# Patient Record
Sex: FEMALE | Race: BLACK OR AFRICAN AMERICAN | Employment: UNEMPLOYED | URBAN - NONMETROPOLITAN AREA
[De-identification: names, ages, dates, MRNs, and addresses within clinical notes are randomized per-mention and may not be internally consistent; named-entity substitution may affect disease eponyms.]

---

## 2017-02-24 ENCOUNTER — TELEPHONE (OUTPATIENT)
Dept: FAMILY MEDICINE CLINIC | Age: 32
End: 2017-02-24

## 2017-03-14 ENCOUNTER — OFFICE VISIT (OUTPATIENT)
Dept: FAMILY MEDICINE CLINIC | Age: 32
End: 2017-03-14

## 2017-03-14 VITALS
DIASTOLIC BLOOD PRESSURE: 70 MMHG | WEIGHT: 192.6 LBS | BODY MASS INDEX: 29.19 KG/M2 | OXYGEN SATURATION: 98 % | HEIGHT: 68 IN | HEART RATE: 89 BPM | SYSTOLIC BLOOD PRESSURE: 120 MMHG

## 2017-03-14 DIAGNOSIS — F41.1 GAD (GENERALIZED ANXIETY DISORDER): Primary | ICD-10-CM

## 2017-03-14 DIAGNOSIS — F41.0 PANIC ATTACKS: ICD-10-CM

## 2017-03-14 DIAGNOSIS — L21.9 SEBORRHEIC DERMATITIS: ICD-10-CM

## 2017-03-14 PROCEDURE — 99214 OFFICE O/P EST MOD 30 MIN: CPT | Performed by: FAMILY MEDICINE

## 2017-03-14 RX ORDER — BUSPIRONE HYDROCHLORIDE 7.5 MG/1
7.5 TABLET ORAL 2 TIMES DAILY PRN
Qty: 40 TABLET | Refills: 1 | Status: SHIPPED | OUTPATIENT
Start: 2017-03-14 | End: 2017-08-14

## 2017-03-14 RX ORDER — VENLAFAXINE HYDROCHLORIDE 150 MG/1
150 CAPSULE, EXTENDED RELEASE ORAL DAILY
Qty: 30 CAPSULE | Refills: 5 | Status: SHIPPED | OUTPATIENT
Start: 2017-03-14 | End: 2017-09-18 | Stop reason: SDUPTHER

## 2017-03-14 RX ORDER — KETOCONAZOLE 20 MG/ML
SHAMPOO TOPICAL
Qty: 120 ML | Refills: 1 | Status: SHIPPED | OUTPATIENT
Start: 2017-03-14 | End: 2019-07-26

## 2017-03-14 RX ORDER — KETOCONAZOLE 20 MG/G
CREAM TOPICAL
Qty: 30 G | Refills: 1 | Status: SHIPPED | OUTPATIENT
Start: 2017-03-14 | End: 2022-06-28 | Stop reason: HOSPADM

## 2017-03-26 ASSESSMENT — ENCOUNTER SYMPTOMS
BLURRED VISION: 0
COUGH: 0
ABDOMINAL PAIN: 0

## 2017-08-14 ENCOUNTER — OFFICE VISIT (OUTPATIENT)
Dept: FAMILY MEDICINE CLINIC | Age: 32
End: 2017-08-14

## 2017-08-14 VITALS
HEART RATE: 108 BPM | HEIGHT: 68 IN | DIASTOLIC BLOOD PRESSURE: 78 MMHG | WEIGHT: 200.6 LBS | OXYGEN SATURATION: 99 % | BODY MASS INDEX: 30.4 KG/M2 | SYSTOLIC BLOOD PRESSURE: 118 MMHG

## 2017-08-14 DIAGNOSIS — F06.30 MENSTRUAL-RELATED MOOD DISORDER: ICD-10-CM

## 2017-08-14 DIAGNOSIS — F41.1 GAD (GENERALIZED ANXIETY DISORDER): Primary | ICD-10-CM

## 2017-08-14 PROCEDURE — 99213 OFFICE O/P EST LOW 20 MIN: CPT | Performed by: FAMILY MEDICINE

## 2017-08-14 RX ORDER — CLONIDINE HYDROCHLORIDE 0.1 MG/1
0.1 TABLET ORAL 2 TIMES DAILY PRN
Qty: 40 TABLET | Refills: 1 | Status: SHIPPED | OUTPATIENT
Start: 2017-08-14 | End: 2018-05-22

## 2017-08-14 RX ORDER — LEVONORGESTREL AND ETHINYL ESTRADIOL 0.15-0.03
1 KIT ORAL DAILY
Qty: 1 PACKET | Refills: 3 | Status: SHIPPED | OUTPATIENT
Start: 2017-08-14 | End: 2018-05-22

## 2017-08-26 ASSESSMENT — ENCOUNTER SYMPTOMS
BLURRED VISION: 0
COUGH: 0
ABDOMINAL PAIN: 0

## 2017-09-18 DIAGNOSIS — F41.0 PANIC ATTACKS: ICD-10-CM

## 2017-09-18 DIAGNOSIS — F41.1 GAD (GENERALIZED ANXIETY DISORDER): ICD-10-CM

## 2017-09-19 RX ORDER — VENLAFAXINE HYDROCHLORIDE 150 MG/1
CAPSULE, EXTENDED RELEASE ORAL
Qty: 30 CAPSULE | Refills: 5 | Status: SHIPPED | OUTPATIENT
Start: 2017-09-19 | End: 2018-04-17 | Stop reason: SDUPTHER

## 2018-03-15 ENCOUNTER — TELEPHONE (OUTPATIENT)
Dept: FAMILY MEDICINE CLINIC | Age: 33
End: 2018-03-15

## 2018-04-17 DIAGNOSIS — F41.0 PANIC ATTACKS: ICD-10-CM

## 2018-04-17 DIAGNOSIS — F41.1 GAD (GENERALIZED ANXIETY DISORDER): ICD-10-CM

## 2018-04-19 RX ORDER — VENLAFAXINE HYDROCHLORIDE 150 MG/1
CAPSULE, EXTENDED RELEASE ORAL
Qty: 30 CAPSULE | Refills: 1 | Status: SHIPPED | OUTPATIENT
Start: 2018-04-19 | End: 2018-06-30 | Stop reason: SDUPTHER

## 2018-05-22 ENCOUNTER — OFFICE VISIT (OUTPATIENT)
Dept: FAMILY MEDICINE CLINIC | Age: 33
End: 2018-05-22

## 2018-05-22 VITALS
HEART RATE: 114 BPM | SYSTOLIC BLOOD PRESSURE: 112 MMHG | BODY MASS INDEX: 32.67 KG/M2 | DIASTOLIC BLOOD PRESSURE: 80 MMHG | OXYGEN SATURATION: 98 % | WEIGHT: 215.6 LBS | HEIGHT: 68 IN

## 2018-05-22 DIAGNOSIS — M25.532 PAIN OF BOTH WRIST JOINTS: ICD-10-CM

## 2018-05-22 DIAGNOSIS — R53.83 FATIGUE, UNSPECIFIED TYPE: ICD-10-CM

## 2018-05-22 DIAGNOSIS — R63.5 WEIGHT GAIN: ICD-10-CM

## 2018-05-22 DIAGNOSIS — F41.9 ANXIETY: Primary | ICD-10-CM

## 2018-05-22 DIAGNOSIS — M25.531 PAIN OF BOTH WRIST JOINTS: ICD-10-CM

## 2018-05-22 LAB
FOLATE: 11.68 NG/ML (ref 4.78–24.2)
HCT VFR BLD CALC: 36.4 % (ref 36–48)
HEMOGLOBIN: 13.1 G/DL (ref 12–16)
MCH RBC QN AUTO: 29.2 PG (ref 26–34)
MCHC RBC AUTO-ENTMCNC: 35.9 G/DL (ref 31–36)
MCV RBC AUTO: 81.3 FL (ref 80–100)
PDW BLD-RTO: 14.1 % (ref 12.4–15.4)
PLATELET # BLD: 401 K/UL (ref 135–450)
PMV BLD AUTO: 7.9 FL (ref 5–10.5)
RBC # BLD: 4.48 M/UL (ref 4–5.2)
RHEUMATOID FACTOR: <10 IU/ML
SEDIMENTATION RATE, ERYTHROCYTE: 22 MM/HR (ref 0–20)
T4 FREE: 0.9 NG/DL (ref 0.9–1.8)
TSH SERPL DL<=0.05 MIU/L-ACNC: 2.07 UIU/ML (ref 0.27–4.2)
VITAMIN B-12: 536 PG/ML (ref 211–911)
VITAMIN D 25-HYDROXY: 18.2 NG/ML
WBC # BLD: 6 K/UL (ref 4–11)

## 2018-05-22 PROCEDURE — G8417 CALC BMI ABV UP PARAM F/U: HCPCS | Performed by: FAMILY MEDICINE

## 2018-05-22 PROCEDURE — 36415 COLL VENOUS BLD VENIPUNCTURE: CPT | Performed by: FAMILY MEDICINE

## 2018-05-22 PROCEDURE — G8427 DOCREV CUR MEDS BY ELIG CLIN: HCPCS | Performed by: FAMILY MEDICINE

## 2018-05-22 PROCEDURE — 1036F TOBACCO NON-USER: CPT | Performed by: FAMILY MEDICINE

## 2018-05-22 PROCEDURE — 99214 OFFICE O/P EST MOD 30 MIN: CPT | Performed by: FAMILY MEDICINE

## 2018-05-22 ASSESSMENT — PATIENT HEALTH QUESTIONNAIRE - PHQ9
SUM OF ALL RESPONSES TO PHQ9 QUESTIONS 1 & 2: 0
2. FEELING DOWN, DEPRESSED OR HOPELESS: 0
SUM OF ALL RESPONSES TO PHQ QUESTIONS 1-9: 0
1. LITTLE INTEREST OR PLEASURE IN DOING THINGS: 0

## 2018-05-23 ASSESSMENT — ENCOUNTER SYMPTOMS
ABDOMINAL PAIN: 0
BLURRED VISION: 0
COUGH: 0

## 2018-05-24 LAB — DOUBLE STRANDED DNA AB, IGG: NORMAL

## 2018-05-31 DIAGNOSIS — E55.9 VITAMIN D DEFICIENCY: Primary | ICD-10-CM

## 2018-05-31 RX ORDER — ERGOCALCIFEROL 1.25 MG/1
50000 CAPSULE ORAL WEEKLY
Qty: 4 CAPSULE | Refills: 5 | Status: SHIPPED | OUTPATIENT
Start: 2018-05-31 | End: 2018-08-17 | Stop reason: SDUPTHER

## 2018-06-30 DIAGNOSIS — F41.1 GAD (GENERALIZED ANXIETY DISORDER): ICD-10-CM

## 2018-06-30 DIAGNOSIS — F41.0 PANIC ATTACKS: ICD-10-CM

## 2018-07-02 RX ORDER — VENLAFAXINE HYDROCHLORIDE 150 MG/1
CAPSULE, EXTENDED RELEASE ORAL
Qty: 30 CAPSULE | Refills: 5 | Status: SHIPPED | OUTPATIENT
Start: 2018-07-02 | End: 2018-09-27 | Stop reason: SDUPTHER

## 2018-07-25 ENCOUNTER — TELEPHONE (OUTPATIENT)
Dept: FAMILY MEDICINE CLINIC | Age: 33
End: 2018-07-25

## 2018-07-26 ENCOUNTER — TELEPHONE (OUTPATIENT)
Dept: FAMILY MEDICINE CLINIC | Age: 33
End: 2018-07-26

## 2018-07-27 ENCOUNTER — OFFICE VISIT (OUTPATIENT)
Dept: FAMILY MEDICINE CLINIC | Age: 33
End: 2018-07-27

## 2018-07-27 VITALS
HEART RATE: 81 BPM | DIASTOLIC BLOOD PRESSURE: 78 MMHG | BODY MASS INDEX: 33.12 KG/M2 | HEIGHT: 67 IN | SYSTOLIC BLOOD PRESSURE: 118 MMHG | OXYGEN SATURATION: 98 % | WEIGHT: 211 LBS

## 2018-07-27 DIAGNOSIS — F41.9 ANXIETY: Primary | ICD-10-CM

## 2018-07-27 PROCEDURE — G8427 DOCREV CUR MEDS BY ELIG CLIN: HCPCS | Performed by: FAMILY MEDICINE

## 2018-07-27 PROCEDURE — 99213 OFFICE O/P EST LOW 20 MIN: CPT | Performed by: FAMILY MEDICINE

## 2018-07-27 PROCEDURE — G8417 CALC BMI ABV UP PARAM F/U: HCPCS | Performed by: FAMILY MEDICINE

## 2018-07-27 PROCEDURE — 1036F TOBACCO NON-USER: CPT | Performed by: FAMILY MEDICINE

## 2018-07-27 NOTE — PROGRESS NOTES
Chief Complaint   Patient presents with    Anxiety     FMLA--feels pretty well and doing better, she thinks vitamin d is very helpful       Ruby NARRATIVE:    Patient presents for completion of FMLA paperwork. She was seen once this year for anxiety and joint pain issues. Anxiety was under good control  With current medications and she was seeing rheumatologist for joint problems. Patient is established unless otherwise noted. Above chief complaint and Ruby obtained by this physician provider. Review of Systems   Constitutional: Negative for fever and malaise/fatigue. HENT: Negative for congestion. Eyes: Negative for blurred vision. Respiratory: Negative for cough. Cardiovascular: Negative for chest pain and leg swelling. Gastrointestinal: Negative for abdominal pain. Musculoskeletal: Negative for myalgias. Skin: Negative for rash. Neurological: Negative for headaches. Psychiatric/Behavioral: Negative for depression. The patient is nervous/anxious (controlled with current med). Allergies   Allergen Reactions    Lexapro [Escitalopram Oxalate]      Multiple side effects     Allergy history updated. Outpatient Prescriptions Marked as Taking for the 7/27/18 encounter (Office Visit) with Frandy Shields MD   Medication Sig Dispense Refill    venlafaxine (EFFEXOR XR) 150 MG extended release capsule TAKE ONE CAPSULE BY MOUTH EVERY DAY 30 capsule 5    vitamin D (ERGOCALCIFEROL) 66904 units CAPS capsule Take 1 capsule by mouth once a week 4 capsule 5    ketoconazole (NIZORAL) 2 % shampoo Apply 5-10 mL to wet scalp, lather, leave on 3 to 5 minutes, and rinse; once every 1-2 wks (prophylaxis) or 2x weekly for 2-4 wks (treatment 120 mL 1    ketoconazole (NIZORAL) 2 % cream Apply topically twice daily x 4wks or until improvement is noted. Then as needed.  30 g 1    acetaminophen (TYLENOL) 325 MG tablet Take 650 mg by mouth every 6 hours as needed for Pain         Tobacco use history

## 2018-08-05 ASSESSMENT — ENCOUNTER SYMPTOMS
ABDOMINAL PAIN: 0
COUGH: 0
BLURRED VISION: 0

## 2018-08-17 DIAGNOSIS — E55.9 VITAMIN D DEFICIENCY: ICD-10-CM

## 2018-08-20 RX ORDER — ERGOCALCIFEROL 1.25 MG/1
CAPSULE ORAL
Qty: 12 CAPSULE | Refills: 5 | Status: SHIPPED | OUTPATIENT
Start: 2018-08-20 | End: 2019-07-26

## 2018-09-27 DIAGNOSIS — F41.0 PANIC ATTACKS: ICD-10-CM

## 2018-09-27 DIAGNOSIS — F41.1 GAD (GENERALIZED ANXIETY DISORDER): ICD-10-CM

## 2018-09-28 RX ORDER — VENLAFAXINE HYDROCHLORIDE 150 MG/1
CAPSULE, EXTENDED RELEASE ORAL
Qty: 90 CAPSULE | Refills: 3 | Status: SHIPPED | OUTPATIENT
Start: 2018-09-28 | End: 2019-07-26

## 2019-07-26 ENCOUNTER — OFFICE VISIT (OUTPATIENT)
Dept: FAMILY MEDICINE CLINIC | Age: 34
End: 2019-07-26
Payer: COMMERCIAL

## 2019-07-26 VITALS
HEART RATE: 79 BPM | BODY MASS INDEX: 35.4 KG/M2 | WEIGHT: 226 LBS | OXYGEN SATURATION: 99 % | SYSTOLIC BLOOD PRESSURE: 124 MMHG | DIASTOLIC BLOOD PRESSURE: 70 MMHG

## 2019-07-26 DIAGNOSIS — F41.0 PANIC ATTACKS: ICD-10-CM

## 2019-07-26 DIAGNOSIS — F41.1 GAD (GENERALIZED ANXIETY DISORDER): Primary | ICD-10-CM

## 2019-07-26 PROCEDURE — 1036F TOBACCO NON-USER: CPT | Performed by: FAMILY MEDICINE

## 2019-07-26 PROCEDURE — G8417 CALC BMI ABV UP PARAM F/U: HCPCS | Performed by: FAMILY MEDICINE

## 2019-07-26 PROCEDURE — 99213 OFFICE O/P EST LOW 20 MIN: CPT | Performed by: FAMILY MEDICINE

## 2019-07-26 PROCEDURE — G8427 DOCREV CUR MEDS BY ELIG CLIN: HCPCS | Performed by: FAMILY MEDICINE

## 2019-07-26 ASSESSMENT — PATIENT HEALTH QUESTIONNAIRE - PHQ9
SUM OF ALL RESPONSES TO PHQ QUESTIONS 1-9: 2
2. FEELING DOWN, DEPRESSED OR HOPELESS: 1
SUM OF ALL RESPONSES TO PHQ QUESTIONS 1-9: 2
SUM OF ALL RESPONSES TO PHQ9 QUESTIONS 1 & 2: 2
1. LITTLE INTEREST OR PLEASURE IN DOING THINGS: 1

## 2019-07-26 NOTE — PROGRESS NOTES
Nursing note reviewed. Vitals:    07/26/19 1309   BP: 124/70   Site: Left Upper Arm   Position: Sitting   Cuff Size: Large Adult   Pulse: 79   SpO2: 99%   Weight: 226 lb (102.5 kg)          BP Readings from Last 3 Encounters:   07/26/19 124/70   07/27/18 118/78   05/22/18 112/80     Wt Readings from Last 3 Encounters:   07/26/19 226 lb (102.5 kg)   07/27/18 211 lb (95.7 kg)   05/22/18 215 lb 9.6 oz (97.8 kg)     Body mass index is 35.4 kg/m². No results found for this visit on 07/26/19. Physical Exam   Constitutional: She is oriented to person, place, and time. She appears well-developed and well-nourished. No distress. HENT:   Head: Normocephalic and atraumatic. Eyes: Pupils are equal, round, and reactive to light. Conjunctivae and EOM are normal.   Cardiovascular: Normal rate and regular rhythm. Pulmonary/Chest: Effort normal. No respiratory distress. Neurological: She is alert and oriented to person, place, and time. Skin: Skin is warm and dry. She is not diaphoretic. Psychiatric: She has a normal mood and affect. Her behavior is normal. Judgment and thought content normal.   Today no issues, mood is bright and appropriate, mainly issue is with panic attacks   Nursing note and vitals reviewed. _________________________________________________  Assessment:     Jennifer was seen today for anxiety. Diagnoses and all orders for this visit:    CARINE (generalized anxiety disorder)    Panic attacks    Problems listed above are stable except as follows: panic attacks are rare but still cause her to miss work occasionally. Therapeutic plan is unchanged unless otherwise specified. See orders above and comments below for details of workup or medication orders. _________________________________________________  Plan: We can try wellbutrin if not doing well. Form completed and copy scanned. Return if symptoms worsen or fail to improve.       Electronically signed by Nicholas Duran MD on 7/26/19 at 1:27 PM

## 2019-07-31 ASSESSMENT — ENCOUNTER SYMPTOMS
WHEEZING: 0
BACK PAIN: 0
CHEST TIGHTNESS: 0
COUGH: 0
SHORTNESS OF BREATH: 0
ABDOMINAL PAIN: 0

## 2019-09-04 ENCOUNTER — OFFICE VISIT (OUTPATIENT)
Dept: FAMILY MEDICINE CLINIC | Age: 34
End: 2019-09-04
Payer: COMMERCIAL

## 2019-09-04 VITALS
WEIGHT: 234.2 LBS | HEART RATE: 80 BPM | DIASTOLIC BLOOD PRESSURE: 72 MMHG | OXYGEN SATURATION: 98 % | SYSTOLIC BLOOD PRESSURE: 122 MMHG | HEIGHT: 68 IN | BODY MASS INDEX: 35.49 KG/M2

## 2019-09-04 DIAGNOSIS — M54.50 ACUTE MIDLINE LOW BACK PAIN WITHOUT SCIATICA: Primary | ICD-10-CM

## 2019-09-04 PROCEDURE — 99213 OFFICE O/P EST LOW 20 MIN: CPT | Performed by: FAMILY MEDICINE

## 2019-09-04 PROCEDURE — G8427 DOCREV CUR MEDS BY ELIG CLIN: HCPCS | Performed by: FAMILY MEDICINE

## 2019-09-04 PROCEDURE — G8417 CALC BMI ABV UP PARAM F/U: HCPCS | Performed by: FAMILY MEDICINE

## 2019-09-04 PROCEDURE — 1036F TOBACCO NON-USER: CPT | Performed by: FAMILY MEDICINE

## 2019-09-04 RX ORDER — TIZANIDINE 4 MG/1
4 TABLET ORAL 2 TIMES DAILY PRN
Qty: 20 TABLET | Refills: 0 | Status: SHIPPED | OUTPATIENT
Start: 2019-09-04 | End: 2019-09-14

## 2019-09-04 ASSESSMENT — ENCOUNTER SYMPTOMS
BACK PAIN: 1
COUGH: 0
SHORTNESS OF BREATH: 0
ABDOMINAL PAIN: 0

## 2019-09-04 NOTE — LETTER
[facility] [address] [place] [address]  [address] [facility],[address] [facility]  Phone: [phone]  Fax: [phone]    Kei Miranda MD        September 4, 2019     Patient: Latrice Prajapati   YOB: 1985   Date of Visit: 9/4/2019       To Whom It May Concern: It is my medical opinion that Gaby Murray needs a varidesk to help with her back pain. If you have any questions or concerns, please don't hesitate to call.     Sincerely,        Kei Miranda MD

## 2019-09-04 NOTE — PROGRESS NOTES
Jose Gale  1985    Chief Complaint   Patient presents with    Lower Back Pain     muscle tightness, for sitting at work, needs letter for standing desk            Patient here c/o:    Back Pain   This is a new problem. Episode onset: 6 months. The problem occurs constantly. The pain is present in the lumbar spine. The quality of the pain is described as aching. The pain does not radiate. The pain is at a severity of 7/10. The pain is moderate. The symptoms are aggravated by sitting and bending. Stiffness is present all day. Pertinent negatives include no abdominal pain, chest pain, dysuria, fever, headaches, leg pain, numbness, paresis, pelvic pain, perianal numbness or tingling. Risk factors include poor posture and lack of exercise (worse by her job sitting position). She has tried analgesics for the symptoms. The treatment provided mild relief. No past medical history on file. No past surgical history on file.   Family History   Problem Relation Age of Onset    Kidney Disease Mother     High Blood Pressure Mother     Diabetes Sister     Other Sister         lupus    Kidney Disease Sister         past kidney issues     Social History     Socioeconomic History    Marital status:      Spouse name: Not on file    Number of children: Not on file    Years of education: Not on file    Highest education level: Not on file   Occupational History    Not on file   Social Needs    Financial resource strain: Not on file    Food insecurity:     Worry: Not on file     Inability: Not on file    Transportation needs:     Medical: Not on file     Non-medical: Not on file   Tobacco Use    Smoking status: Former Smoker     Last attempt to quit: 2011     Years since quittin.1    Smokeless tobacco: Never Used   Substance and Sexual Activity    Alcohol use: Yes     Comment: socially    Drug use: No    Sexual activity: Yes     Comment: Patient was prescribed the pill, but never Wt 234 lb 3.2 oz (106.2 kg)   SpO2 98%   BMI 35.61 kg/m²     BP Readings from Last 3 Encounters:   09/04/19 122/72   07/26/19 124/70   07/27/18 118/78       Wt Readings from Last 3 Encounters:   09/04/19 234 lb 3.2 oz (106.2 kg)   07/26/19 226 lb (102.5 kg)   07/27/18 211 lb (95.7 kg)         Physical Exam   Constitutional: She is oriented to person, place, and time. She appears well-developed and well-nourished. Cardiovascular: Normal rate, regular rhythm and normal heart sounds. Pulmonary/Chest: Effort normal and breath sounds normal. She has no wheezes. Musculoskeletal:        Lumbar back: She exhibits decreased range of motion, tenderness, pain and spasm. She exhibits no swelling, no edema and no deformity. Neurological: She is alert and oriented to person, place, and time. ASSESSMENT/ PLAN:    1. Acute midline low back pain without sciatica  - tiZANidine (ZANAFLEX) 4 MG tablet; Take 1 tablet by mouth 2 times daily as needed (spasm)  Dispense: 20 tablet; Refill: 0  - will take OTC ibuprofen. - the note for work to get the veridesk to help her pain was given to the patient. - Appropriateprescription are addressed. - After visit summery provided. - Questions answered and patient verbalizes understanding.  - Call for any problem, questions, or concerns. Return if symptoms worsen or fail to improve.

## 2020-07-28 ENCOUNTER — TELEMEDICINE (OUTPATIENT)
Dept: FAMILY MEDICINE CLINIC | Age: 35
End: 2020-07-28
Payer: COMMERCIAL

## 2020-07-28 PROCEDURE — 99213 OFFICE O/P EST LOW 20 MIN: CPT | Performed by: FAMILY MEDICINE

## 2020-07-28 PROCEDURE — G8428 CUR MEDS NOT DOCUMENT: HCPCS | Performed by: FAMILY MEDICINE

## 2020-07-28 RX ORDER — CLONIDINE HYDROCHLORIDE 0.1 MG/1
0.1 TABLET ORAL 2 TIMES DAILY PRN
Qty: 40 TABLET | Refills: 1 | Status: SHIPPED | OUTPATIENT
Start: 2020-07-28 | End: 2021-06-01

## 2020-07-28 RX ORDER — BUPROPION HYDROCHLORIDE 150 MG/1
150 TABLET ORAL EVERY MORNING
Qty: 30 TABLET | Refills: 5 | Status: SHIPPED | OUTPATIENT
Start: 2020-07-28 | End: 2021-06-01

## 2020-07-28 ASSESSMENT — ENCOUNTER SYMPTOMS
CHEST TIGHTNESS: 0
ABDOMINAL PAIN: 0
SHORTNESS OF BREATH: 0
BACK PAIN: 0
WHEEZING: 0
COUGH: 0

## 2020-07-28 NOTE — PROGRESS NOTES
2020    TELEHEALTH EVALUATION -- Audio/Visual (During [facility]-05 public health emergency)    TELEHEALTH services provided via My Chart application for this patient who was at home during the visit and the provider at [facility], unless otherwise specified below. 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 Mood and anxiety are worse. She was previously on medication (effexor) but it had significant side effects. She does not want to get so bad again that she has panic attacks. Chief Complaint   Patient presents with    Anxiety     mom had a stroke and father passed away and COVID-19 stress, no panic       HPI: Mood and anxiety are a little worse, she thinks going back on medication would be helpful. Review of Systems   Constitutional: Negative for activity change, chills, fatigue and fever. HENT: Negative for congestion. Respiratory: Negative for cough, chest tightness, shortness of breath and wheezing. Cardiovascular: Negative for chest pain and leg swelling. Gastrointestinal: Negative for abdominal pain. Musculoskeletal: Negative for back pain and myalgias. Skin: Negative for rash. Psychiatric/Behavioral: Positive for sleep disturbance. Negative for behavioral problems and dysphoric mood. The patient is nervous/anxious. Prior to Visit Medications    Medication Sig Taking? Authorizing Provider   buPROPion (WELLBUTRIN XL) 150 MG extended release tablet Take 1 tablet by mouth every morning Yes Rufino Cornejo MD   cloNIDine (CATAPRES) 0.1 MG tablet Take 1 tablet by mouth 2 times daily as needed for Other (anxiety) Yes Rufino Cornejo MD   ketoconazole (NIZORAL) 2 % cream Apply topically twice daily x 4wks or until improvement is noted. Then as needed.   Rufino Cornejo MD acetaminophen (TYLENOL) 325 MG tablet Take 650 mg by mouth every 6 hours as needed for Pain  Historical Provider, MD       Social History     Tobacco Use    Smoking status: Former Smoker     Last attempt to quit: 2011     Years since quittin.0    Smokeless tobacco: Never Used   Substance Use Topics    Alcohol use: Yes     Comment: socially    Drug use: No            VISIT VITALS AND DATA (may not be from today):  Nursing note reviewed  There were no vitals taken for this visit. BP Readings from Last 3 Encounters:   19 122/72   19 124/70   18 118/78     Wt Readings from Last 3 Encounters:   19 234 lb 3.2 oz (106.2 kg)   19 226 lb (102.5 kg)   18 211 lb (95.7 kg)     There is no height or weight on file to calculate BMI. No results found for this visit on 20. PHYSICAL EXAMINATION:  [ INSTRUCTIONS:  \"[x]\" Indicates a positive item  \"[]\" Indicates a negative item  -- DELETE ALL ITEMS NOT EXAMINED]  Vital Signs: (As obtained by patient/caregiver or practitioner observation)    Blood pressure-  Heart rate-    Respiratory rate-    Temperature-  Pulse oximetry-     Constitutional: [x] Appears well-developed and well-nourished [x] No apparent distress      [] Abnormal-   Mental status  [x] Alert and awake  [x] Oriented to person/place/time [x]Able to follow commands      Eyes:  EOM    [x]  Normal  [] Abnormal-  Sclera  [x]  Normal  [] Abnormal -         Discharge [x]  None visible  [] Abnormal -    HENT:   [x] Normocephalic, atraumatic.   [] Abnormal   [x] Mouth/Throat: Mucous membranes are moist.     External Ears [x] Normal  [] Abnormal-     Neck: [x] No visualized mass     Pulmonary/Chest: [] Respiratory effort normal.  [x] No visualized signs of difficulty breathing or respiratory distress        [] Abnormal-      Musculoskeletal:   [x] Normal gait with no signs of ataxia         [x] Normal range of motion of neck        [] Abnormal-       Neurological: [x] No Facial Asymmetry (Cranial nerve 7 motor function) (limited exam to video visit)          [x] No gaze palsy        [] Abnormal-         Skin:        [x] No significant exanthematous lesions or discoloration noted on facial skin         [] Abnormal-            Psychiatric:       [] Normal Affect [x] No Hallucinations        [x] Abnormal- reports anxiety and is tearful discussing family illness and loss  Other pertinent observable physical exam findings-     ASSESSMENT/PLAN:   Diagnosis Orders   1. Stress reaction  buPROPion (WELLBUTRIN XL) 150 MG extended release tablet    cloNIDine (CATAPRES) 0.1 MG tablet   2. CARINE (generalized anxiety disorder)  buPROPion (WELLBUTRIN XL) 150 MG extended release tablet    cloNIDine (CATAPRES) 0.1 MG tablet     She will let me know how she is doing and if medication working well or not. Return if symptoms worsen or fail to improve. Anatoliy Tellez is a 36 y.o. female being evaluated by a Virtual Visit (video visit) encounter to address concerns as mentioned above. A caregiver was present when appropriate. Due to this being a TeleHealth encounter (During [facility]- public health emergency), evaluation of the following organ systems was limited: Vitals/Constitutional/EENT/Resp/CV/GI//MS/Neuro/Skin/Heme-Lymph-Imm. Pursuant to the emergency declaration under the [address] [place] [address] authority and the [employer] and [employer] Act, this Virtual Visit was conducted with patient's (and/or legal guardian's) consent, to reduce the patient's risk of exposure to COVID-19 and provide necessary medical care. The patient (and/or legal guardian) has also been advised to contact this office for worsening conditions or problems, and seek emergency medical treatment and/or call 911 if deemed necessary.      Services were provided through a video synchronous discussion virtually to substitute for in-person clinic visit.     --Evelyn Marroquin MD on 7/28/2020 at 1:30 PM    An electronic signature was used to authenticate this note.

## 2020-08-21 ENCOUNTER — OFFICE VISIT (OUTPATIENT)
Dept: FAMILY MEDICINE CLINIC | Age: 35
End: 2020-08-21
Payer: COMMERCIAL

## 2020-08-21 VITALS
TEMPERATURE: 97.9 F | OXYGEN SATURATION: 99 % | WEIGHT: 198.2 LBS | HEART RATE: 66 BPM | DIASTOLIC BLOOD PRESSURE: 70 MMHG | HEIGHT: 68 IN | BODY MASS INDEX: 30.04 KG/M2 | SYSTOLIC BLOOD PRESSURE: 122 MMHG

## 2020-08-21 PROCEDURE — G8417 CALC BMI ABV UP PARAM F/U: HCPCS | Performed by: FAMILY MEDICINE

## 2020-08-21 PROCEDURE — G8427 DOCREV CUR MEDS BY ELIG CLIN: HCPCS | Performed by: FAMILY MEDICINE

## 2020-08-21 PROCEDURE — 1036F TOBACCO NON-USER: CPT | Performed by: FAMILY MEDICINE

## 2020-08-21 PROCEDURE — 99213 OFFICE O/P EST LOW 20 MIN: CPT | Performed by: FAMILY MEDICINE

## 2020-08-21 PROCEDURE — 96372 THER/PROPH/DIAG INJ SC/IM: CPT | Performed by: FAMILY MEDICINE

## 2020-08-21 RX ORDER — ETODOLAC 400 MG/1
400 TABLET, EXTENDED RELEASE ORAL 2 TIMES DAILY PRN
Qty: 45 TABLET | Refills: 1 | Status: SHIPPED | OUTPATIENT
Start: 2020-08-21 | End: 2021-06-01

## 2020-08-21 RX ORDER — KETOROLAC TROMETHAMINE 30 MG/ML
60 INJECTION, SOLUTION INTRAMUSCULAR; INTRAVENOUS ONCE
Status: COMPLETED | OUTPATIENT
Start: 2020-08-21 | End: 2020-08-21

## 2020-08-21 RX ORDER — DEXAMETHASONE SODIUM PHOSPHATE 4 MG/ML
8 INJECTION, SOLUTION INTRA-ARTICULAR; INTRALESIONAL; INTRAMUSCULAR; INTRAVENOUS; SOFT TISSUE ONCE
Status: COMPLETED | OUTPATIENT
Start: 2020-08-21 | End: 2020-08-21

## 2020-08-21 RX ORDER — METAXALONE 800 MG/1
800 TABLET ORAL 2 TIMES DAILY PRN
Qty: 30 TABLET | Refills: 2 | Status: SHIPPED | OUTPATIENT
Start: 2020-08-21 | End: 2020-09-05

## 2020-08-21 RX ADMIN — DEXAMETHASONE SODIUM PHOSPHATE 8 MG: 4 INJECTION, SOLUTION INTRA-ARTICULAR; INTRALESIONAL; INTRAMUSCULAR; INTRAVENOUS; SOFT TISSUE at 09:46

## 2020-08-21 RX ADMIN — KETOROLAC TROMETHAMINE 60 MG: 30 INJECTION, SOLUTION INTRAMUSCULAR; INTRAVENOUS at 09:45

## 2020-08-21 ASSESSMENT — PATIENT HEALTH QUESTIONNAIRE - PHQ9
SUM OF ALL RESPONSES TO PHQ9 QUESTIONS 1 & 2: 0
1. LITTLE INTEREST OR PLEASURE IN DOING THINGS: 0
SUM OF ALL RESPONSES TO PHQ QUESTIONS 1-9: 0
2. FEELING DOWN, DEPRESSED OR HOPELESS: 0
SUM OF ALL RESPONSES TO PHQ QUESTIONS 1-9: 0

## 2020-08-21 NOTE — PROGRESS NOTES
Chief Complaint   Patient presents with    Back Pain     had back pain before seen Dr Rowan Vizcaino, midback to shoulder area       [place] NARRATIVE:    Started with walking program in neighborhood. This pain has been present off and on for a year, but current episode for weeks. No longer using standing desk but now working from home. This may be a contributor. She also admits current stressors. Today upper back the worst problem, she is very tender to palpation of the paraspinals and trapezius muscles especially on the right, spasm is noted. She also has lumbar spine spasm and tenderness. Patient is established unless otherwise noted. Above chief complaint and [place] obtained by this physician provider. Review of Systems   Constitutional: Negative for activity change, chills, fatigue and fever. HENT: Negative for congestion. Respiratory: Negative for cough, chest tightness, shortness of breath and wheezing. Cardiovascular: Negative for chest pain and leg swelling. Gastrointestinal: Negative for abdominal pain. Musculoskeletal: Positive for back pain (lumbar and thoracic spine pain and limited rom, including upper shoulder area). Negative for myalgias. Skin: Negative for rash. Psychiatric/Behavioral: Negative for behavioral problems and dysphoric mood. Allergies   Allergen Reactions    Effexor Xr [Venlafaxine Hcl Er] Other (See Comments)     Decreased libido and inability to attain orgasm    Lexapro [Escitalopram Oxalate]      Multiple side effects     Allergy historyupdated.     Outpatient Medications Marked as Taking for the 8/21/20 encounter (Office Visit) with Ralph Madison MD   Medication Sig Dispense Refill    etodolac (LODINE XL) 400 MG extended release tablet Take 1 tablet by mouth 2 times daily as needed (back pain) 45 tablet 1    metaxalone (SKELAXIN) 800 MG tablet Take 1 tablet by mouth 2 times daily as needed for Pain 30 tablet 2    buPROPion (WELLBUTRIN XL) 150 MG extended release tablet Take 1 tablet by mouth every morning 30 tablet 5    cloNIDine (CATAPRES) 0.1 MG tablet Take 1 tablet by mouth 2 times daily as needed for Other (anxiety) 40 tablet 1    ketoconazole (NIZORAL) 2 % cream Apply topically twice daily x 4wks or until improvement is noted. Then as needed. 30 g 1    acetaminophen (TYLENOL) 325 MG tablet Take 650 mg by mouth every 6 hours as needed for Pain         Tobacco use history updated. Social History     Tobacco Use   Smoking Status Former Smoker    Last attempt to quit: 2011    Years since quittin.0   Smokeless Tobacco Never Used        Nursing note reviewed. Vitals:    20 0914   BP: 122/70   Site: Left Upper Arm   Position: Sitting   Cuff Size: Medium Adult   Pulse: 66   Temp: 97.9 °F (36.6 °C)   SpO2: 99%   Weight: 198 lb 3.2 oz (89.9 kg)   Height: 5' 8\" (1.727 m)          BP Readings from Last 3 Encounters:   20 122/70   19 122/72   19 124/70     Wt Readings from Last 3 Encounters:   20 198 lb 3.2 oz (89.9 kg)   19 234 lb 3.2 oz (106.2 kg)   19 226 lb (102.5 kg)     Body mass index is 30.14 kg/m². No results found for this visit on 20. Physical Exam  Vitals signs and nursing note reviewed. Constitutional:       General: She is not in acute distress. Appearance: She is well-developed. She is not diaphoretic. HENT:      Head: Normocephalic and atraumatic. Eyes:      Conjunctiva/sclera: Conjunctivae normal.      Pupils: Pupils are equal, round, and reactive to light. Cardiovascular:      Rate and Rhythm: Normal rate and regular rhythm. Heart sounds: Normal heart sounds. No murmur. Pulmonary:      Effort: Pulmonary effort is normal. No respiratory distress. Breath sounds: Normal breath sounds. No wheezing. Skin:     General: Skin is warm and dry. Neurological:      Mental Status: She is alert and oriented to person, place, and time. _________________________________________________  Assessment:     Jennifer was seen today for back pain. Diagnoses and all orders for this visit:    Acute left-sided low back pain without sciatica  -     XR LUMBAR SPINE (2-3 VIEWS); Future  -     etodolac (LODINE XL) 400 MG extended release tablet; Take 1 tablet by mouth 2 times daily as needed (back pain)  -     metaxalone (SKELAXIN) 800 MG tablet; Take 1 tablet by mouth 2 times daily as needed for Pain  -     dexamethasone (DECADRON) injection 8 mg  -     ketorolac (TORADOL) injection 60 mg    Acute left-sided thoracic back pain  -     XR LUMBAR SPINE (2-3 VIEWS); Future  -     XR THORACIC SPINE (2 VIEWS); Future  -     etodolac (LODINE XL) 400 MG extended release tablet; Take 1 tablet by mouth 2 times daily as needed (back pain)  -     metaxalone (SKELAXIN) 800 MG tablet; Take 1 tablet by mouth 2 times daily as needed for Pain  -     dexamethasone (DECADRON) injection 8 mg  -     ketorolac (TORADOL) injection 60 mg      Problems listed above are stable except as follows:worsened upper and lower back pain without injury, pain concentrated in paraspinals and also across posterior waistline. No radiation to upper or lower extremities. Therapeutic plan is unchanged unless otherwise specified. See orders above and comments below for details of workup or medication orders. _________________________________________________  Plan:     Medications as above for acute pain, and for pain at home. It may be related to change in working environment. We will check plain films. We could consider PT, she was given a handout with home exercises. She is presently seeing a chiropractor and can finish therapy there of course. .      No follow-ups on file.       Electronically signed by Tanisha Linder MD on 8/21/20 at 9:27 AM EDT

## 2020-08-22 ASSESSMENT — ENCOUNTER SYMPTOMS
WHEEZING: 0
ABDOMINAL PAIN: 0
CHEST TIGHTNESS: 0
COUGH: 0
SHORTNESS OF BREATH: 0
BACK PAIN: 1

## 2020-08-24 ENCOUNTER — TELEPHONE (OUTPATIENT)
Dept: FAMILY MEDICINE CLINIC | Age: 35
End: 2020-08-24

## 2020-08-24 NOTE — TELEPHONE ENCOUNTER
Pt called and stated that she started to feel weak and fatigues. Pt states that she has a weird numbness. And her legs are feeling very heavy.   I advised pt to not take anymore medication until she hears back and also if Sx get much worse to go to ED

## 2020-08-25 NOTE — TELEPHONE ENCOUNTER
So I am sorry she is not better. If she truly has developing weakness we may need to have her seen in the ER or walk in for reassessment to consider other neurological causes of pain and weakness (eg guillain barre, MS, etc). But put her back on with me in 2 weeks or so to reassess otherwise.

## 2020-08-26 NOTE — TELEPHONE ENCOUNTER
Patient notified of PCP notes. Verbal understanding given. Appt scheduled for recheck.    Patient states she stopped medications because she was unsure if it was her causes of weakness and fatigue

## 2021-06-01 ENCOUNTER — OFFICE VISIT (OUTPATIENT)
Dept: FAMILY MEDICINE CLINIC | Age: 36
End: 2021-06-01
Payer: COMMERCIAL

## 2021-06-01 VITALS
SYSTOLIC BLOOD PRESSURE: 120 MMHG | BODY MASS INDEX: 34.86 KG/M2 | OXYGEN SATURATION: 96 % | HEART RATE: 82 BPM | HEIGHT: 68 IN | DIASTOLIC BLOOD PRESSURE: 86 MMHG | WEIGHT: 230 LBS

## 2021-06-01 DIAGNOSIS — L72.3 INFECTED SEBACEOUS CYST: ICD-10-CM

## 2021-06-01 DIAGNOSIS — M54.50 CHRONIC MIDLINE LOW BACK PAIN WITHOUT SCIATICA: ICD-10-CM

## 2021-06-01 DIAGNOSIS — F43.21 ADJUSTMENT DISORDER WITH DEPRESSED MOOD: ICD-10-CM

## 2021-06-01 DIAGNOSIS — F41.1 GAD (GENERALIZED ANXIETY DISORDER): ICD-10-CM

## 2021-06-01 DIAGNOSIS — G89.29 CHRONIC MIDLINE LOW BACK PAIN WITHOUT SCIATICA: ICD-10-CM

## 2021-06-01 DIAGNOSIS — L08.9 INFECTED SEBACEOUS CYST: ICD-10-CM

## 2021-06-01 DIAGNOSIS — L30.4 INTERTRIGO: Primary | ICD-10-CM

## 2021-06-01 PROCEDURE — G8417 CALC BMI ABV UP PARAM F/U: HCPCS | Performed by: FAMILY MEDICINE

## 2021-06-01 PROCEDURE — 1036F TOBACCO NON-USER: CPT | Performed by: FAMILY MEDICINE

## 2021-06-01 PROCEDURE — G8427 DOCREV CUR MEDS BY ELIG CLIN: HCPCS | Performed by: FAMILY MEDICINE

## 2021-06-01 PROCEDURE — 99214 OFFICE O/P EST MOD 30 MIN: CPT | Performed by: FAMILY MEDICINE

## 2021-06-01 RX ORDER — PROPRANOLOL HYDROCHLORIDE 10 MG/1
10 TABLET ORAL PRN
COMMUNITY
Start: 2021-04-21 | End: 2022-02-21

## 2021-06-01 RX ORDER — NORETHINDRONE ACETATE AND ETHINYL ESTRADIOL 1; .02 MG/1; MG/1
1 TABLET ORAL DAILY
COMMUNITY
Start: 2021-04-16 | End: 2022-02-21

## 2021-06-01 RX ORDER — NYSTATIN 100000 [USP'U]/G
POWDER TOPICAL
Qty: 60 G | Refills: 1 | Status: SHIPPED | OUTPATIENT
Start: 2021-06-01 | End: 2022-06-28 | Stop reason: HOSPADM

## 2021-06-01 ASSESSMENT — PATIENT HEALTH QUESTIONNAIRE - PHQ9
SUM OF ALL RESPONSES TO PHQ QUESTIONS 1-9: 0
1. LITTLE INTEREST OR PLEASURE IN DOING THINGS: 0
2. FEELING DOWN, DEPRESSED OR HOPELESS: 0
SUM OF ALL RESPONSES TO PHQ9 QUESTIONS 1 & 2: 0

## 2021-06-01 NOTE — PROGRESS NOTES
Chief Complaint   Patient presents with    Other     pt needs PA from PCP for panniculectomy procedure        [place] NARRATIVE:    Has not seen plastics but may use [facility], with several different doctors. She is here requesting us to do a prior auth for this procedure, but since we will not be performing the procedure I explained we could only write a letter to recommend it. She reports belly fold gets rash in the skin fold, and gets seborrheic dermatitis. It prevents her from working out. Also she recently developed sebaceous cyst on left abd and went to er who drained it and then put her on atb which she is finishing now. Lower back pain, worse with weight gain and pregnancy. Reports clothes do fit well. She reports mood and self image issues worsened by her appearance. Even after weight loss she has had the loose skin gets worse and infection stays. Using topical clotirmazole when rash gets bad, also uses OTC psoriasis cream also hydrocortisone. Sx recurred even after walking last severeal weeks, instead of heavier workouts. She unfortunately lost a pregnancy later in April after abnormal prenatal test. She is tearful about that, and states she has decided to take care of herself for a while, hence these plans. Patient is established unless otherwise noted. Above chief complaint and [place] obtained by this physician provider. Review of Systems   Constitutional: Negative for activity change, chills, fatigue and fever. HENT: Negative for congestion. Respiratory: Negative for cough, chest tightness, shortness of breath and wheezing. Cardiovascular: Negative for chest pain and leg swelling. Gastrointestinal: Negative for abdominal pain. Musculoskeletal: Negative for back pain and myalgias. Skin: Positive for rash (rash and abscess in abdominal skin fold). Psychiatric/Behavioral: Negative for behavioral problems and dysphoric mood.        Allergies Allergen Reactions    Effexor Xr [Venlafaxine Hcl Er] Other (See Comments)     Decreased libido and inability to attain orgasm    Lexapro [Escitalopram Oxalate]      Multiple side effects     Allergy historyupdated. Outpatient Medications Marked as Taking for the 21 encounter (Office Visit) with Yana Mandujano MD   Medication Sig Dispense Refill    propranolol (INDERAL) 10 MG tablet Take 10 mg by mouth as needed      norethindrone-ethinyl estradiol (MICROGESTIN ) 1-20 MG-MCG per tablet Take 1 tablet by mouth daily      nystatin (MYCOSTATIN) 522810 UNIT/GM powder Apply 3 times daily. 60 g 1    Vortioxetine HBr (TRINTELLIX PO) Take 15 mg by mouth daily      ketoconazole (NIZORAL) 2 % cream Apply topically twice daily x 4wks or until improvement is noted. Then as needed. 30 g 1       Tobacco use history updated. Social History     Tobacco Use   Smoking Status Former Smoker    Quit date: 2011    Years since quittin.9   Smokeless Tobacco Never Used        Nursing note reviewed. Vitals:    21 1034   BP: 120/86   Site: Left Upper Arm   Position: Sitting   Cuff Size: Medium Adult   Pulse: 82   SpO2: 96%   Weight: 230 lb (104.3 kg)   Height: 5' 8\" (1.727 m)          BP Readings from Last 3 Encounters:   21 120/86   20 122/70   19 122/72     Wt Readings from Last 3 Encounters:   21 230 lb (104.3 kg)   20 198 lb 3.2 oz (89.9 kg)   19 234 lb 3.2 oz (106.2 kg)     Body mass index is 34.97 kg/m². No results found for this visit on 21. Physical Exam  Vitals and nursing note reviewed. Constitutional:       General: She is not in acute distress. Appearance: She is well-developed. She is not diaphoretic. HENT:      Head: Normocephalic and atraumatic. Eyes:      Conjunctiva/sclera: Conjunctivae normal.      Pupils: Pupils are equal, round, and reactive to light. Cardiovascular:      Rate and Rhythm: Normal rate and regular rhythm. Heart sounds: Normal heart sounds. No murmur heard. Pulmonary:      Effort: Pulmonary effort is normal. No respiratory distress. Breath sounds: Normal breath sounds. No wheezing. Skin:     General: Skin is warm and dry. Findings: Erythema and rash (she does have moderate rash and closing abscessed area to abdomen with overhanging panniculus) present. Neurological:      Mental Status: She is alert and oriented to person, place, and time. _________________________________________________  Assessment:     1. Intertrigo  -     nystatin (MYCOSTATIN) 366556 UNIT/GM powder; Apply 3 times daily. , Disp-60 g, R-1, Normal  2. Chronic midline low back pain without sciatica  3. Infected sebaceous cyst  4. Adjustment disorder with depressed mood  5. CARINE (generalized anxiety disorder)    She is on multiple medications for mood. She will let me know when she picks a surgeon for the procedure. We can send a letter supporting the procedure but that specialist will of course obtain certification for conducting the procedure. SHE HAS NOT had weight loss surgery. I would suggest she return to exercise and try showering/blowdrying area/applying powder since it would be more valuable for her mental and emotional health. Problems listed above are stable and therapeutic plan is unchanged unless otherwise specified. See orders above and comments below for details of workup or medication orders. _________________________________________________  Plan: We will generate PA letter and leave in chart, she can call to have it faxed. Prob Dr. Rob Welch. Return in about 6 months (around 12/1/2021), or if symptoms worsen or fail to improve.       Electronically signed by Alaina Love MD on 6/1/21 at 10:41 AM EDT

## 2021-06-27 ASSESSMENT — ENCOUNTER SYMPTOMS
ABDOMINAL PAIN: 0
BACK PAIN: 0
COUGH: 0
WHEEZING: 0
SHORTNESS OF BREATH: 0
CHEST TIGHTNESS: 0

## 2021-07-27 ENCOUNTER — TELEPHONE (OUTPATIENT)
Dept: FAMILY MEDICINE CLINIC | Age: 36
End: 2021-07-27

## 2021-07-27 NOTE — LETTER
[address] [facility]   LANETTE ESPARZA [facility]  Phone: [phone]  Fax: [phone]    January Stevens          July 30, 2021     Dr. Steph Greene  [address] [facility] [id number]  [facility], [address]    Ph. [phone]    Patient: Jacy Mcduffie   MR Number: [id number]   YOB: 1985   Date of Visit: 7/27/2021       Dear Dr. Ying Morris: I am referring my patient, Alondra Arndt, to you for evaluation of enlarged belly fat fold with chronic skin infections (panniculitits). Please evaluate her for consideration of plastic surgery to remove excess skin in this area. She  has no past medical history on file. Her  has no past surgical history on file. She  reports that she quit smoking about 10 years ago. She has never used smokeless tobacco. She reports current alcohol use. She reports that she does not use drugs. She has a current medication list which includes the following prescription(s): propranolol, norethindrone-ethinyl estradiol, nystatin, vortioxetine hbr, and ketoconazole. She is allergic to effexor xr [venlafaxine hcl er] and lexapro [escitalopram oxalate]. I appreciate your assistance in her care and look forward to your findings and recommendations.     Sincerely,                           Chitra Riddle

## 2021-07-27 NOTE — TELEPHONE ENCOUNTER
Pt  Requesting letter as discussed in office for plastic surgery with surgeons name Dr. Darin Caputo. Please advise.

## 2021-10-11 DIAGNOSIS — F41.1 GAD (GENERALIZED ANXIETY DISORDER): ICD-10-CM

## 2021-10-11 DIAGNOSIS — F43.0 STRESS REACTION: ICD-10-CM

## 2021-10-11 RX ORDER — CLONIDINE HYDROCHLORIDE 0.1 MG/1
0.1 TABLET ORAL 2 TIMES DAILY PRN
Qty: 40 TABLET | Refills: 1 | Status: SHIPPED | OUTPATIENT
Start: 2021-10-11 | End: 2022-04-26 | Stop reason: ALTCHOICE

## 2021-10-11 RX ORDER — BUPROPION HYDROCHLORIDE 150 MG/1
150 TABLET ORAL EVERY MORNING
Qty: 30 TABLET | Refills: 5 | Status: SHIPPED | OUTPATIENT
Start: 2021-10-11 | End: 2021-11-12 | Stop reason: SDUPTHER

## 2021-10-29 ENCOUNTER — E-VISIT (OUTPATIENT)
Dept: FAMILY MEDICINE CLINIC | Age: 36
End: 2021-10-29
Payer: COMMERCIAL

## 2021-10-29 DIAGNOSIS — N30.00 ACUTE CYSTITIS WITHOUT HEMATURIA: Primary | ICD-10-CM

## 2021-10-29 PROCEDURE — 99421 OL DIG E/M SVC 5-10 MIN: CPT | Performed by: FAMILY MEDICINE

## 2021-10-29 RX ORDER — NITROFURANTOIN 25; 75 MG/1; MG/1
100 CAPSULE ORAL 2 TIMES DAILY
Qty: 10 CAPSULE | Refills: 0 | Status: SHIPPED | OUTPATIENT
Start: 2021-10-29 | End: 2021-11-03

## 2021-10-29 NOTE — PROGRESS NOTES
HPI: as per patient provided history  Exam: N/A (electronic visit)  ASSESSMENT/PLAN:  1. Acute cystitis without hematuria    - nitrofurantoin, macrocrystal-monohydrate, (MACROBID) 100 MG capsule; Take 1 capsule by mouth 2 times daily for 5 days  Dispense: 10 capsule; Refill: 0      Patient instructed to call the office if worsens, or fails to improve as anticipated. 5-10 minutes were spent on the digital evaluation and management of this patient.

## 2022-02-21 ENCOUNTER — OFFICE VISIT (OUTPATIENT)
Dept: FAMILY MEDICINE CLINIC | Age: 37
End: 2022-02-21
Payer: COMMERCIAL

## 2022-02-21 VITALS
WEIGHT: 227 LBS | OXYGEN SATURATION: 94 % | HEART RATE: 97 BPM | BODY MASS INDEX: 34.52 KG/M2 | SYSTOLIC BLOOD PRESSURE: 122 MMHG | DIASTOLIC BLOOD PRESSURE: 82 MMHG

## 2022-02-21 DIAGNOSIS — F40.01 AGORAPHOBIA WITH PANIC ATTACKS: ICD-10-CM

## 2022-02-21 DIAGNOSIS — F41.1 GAD (GENERALIZED ANXIETY DISORDER): ICD-10-CM

## 2022-02-21 DIAGNOSIS — F43.21 ADJUSTMENT DISORDER WITH DEPRESSED MOOD: Primary | ICD-10-CM

## 2022-02-21 PROCEDURE — 1036F TOBACCO NON-USER: CPT | Performed by: FAMILY MEDICINE

## 2022-02-21 PROCEDURE — G8417 CALC BMI ABV UP PARAM F/U: HCPCS | Performed by: FAMILY MEDICINE

## 2022-02-21 PROCEDURE — 99213 OFFICE O/P EST LOW 20 MIN: CPT | Performed by: FAMILY MEDICINE

## 2022-02-21 PROCEDURE — G8427 DOCREV CUR MEDS BY ELIG CLIN: HCPCS | Performed by: FAMILY MEDICINE

## 2022-02-21 PROCEDURE — G8484 FLU IMMUNIZE NO ADMIN: HCPCS | Performed by: FAMILY MEDICINE

## 2022-02-21 RX ORDER — LORAZEPAM 0.5 MG/1
0.5 TABLET ORAL 2 TIMES DAILY PRN
Qty: 60 TABLET | Refills: 0 | Status: SHIPPED | OUTPATIENT
Start: 2022-02-21 | End: 2022-03-20 | Stop reason: SDUPTHER

## 2022-02-21 NOTE — PROGRESS NOTES
Chief Complaint   Patient presents with    Anxiety       [place] NARRATIVE:    Reports increasing sx of panic and agoraphobia. She lost a pregnancy almost one year ago with severe congenital malformations. She and her  choose to terminate the pregnancy after much heart and soul searching. She has been taking care of herself and states overall she is doing well but still sad and worse lately. Requesting we complete FMLA starting 3/1/2022 running through 1 year with absences intermittently as per form from 7/2020. Old copy was reviewed and new copy completed. Patient is established unless otherwise noted. Above chief complaint and [place] obtained by this physician provider. Review of Systems   Constitutional: Negative for activity change, chills, fatigue and fever. HENT: Negative for congestion. Respiratory: Negative for cough, chest tightness, shortness of breath and wheezing. Cardiovascular: Negative for chest pain and leg swelling. Gastrointestinal: Negative for abdominal pain. Musculoskeletal: Negative for back pain and myalgias. Skin: Negative for rash. Psychiatric/Behavioral: Positive for dysphoric mood and sleep disturbance. Negative for behavioral problems. The patient is nervous/anxious. Allergies   Allergen Reactions    Effexor Xr [Venlafaxine Hcl Er] Other (See Comments)     Decreased libido and inability to attain orgasm    Lexapro [Escitalopram Oxalate]      Multiple side effects     Allergy historyupdated. Outpatient Medications Marked as Taking for the 2/21/22 encounter (Office Visit) with Mariella Yoon MD   Medication Sig Dispense Refill    LORazepam (ATIVAN) 0.5 MG tablet Take 1 tablet by mouth 2 times daily as needed for Anxiety for up to 30 days.  60 tablet 0    buPROPion (WELLBUTRIN XL) 300 MG extended release tablet Take 1 tablet by mouth every morning 30 tablet 5    cloNIDine (CATAPRES) 0.1 MG tablet Take 1 tablet by mouth 2 times daily as needed for Other (anxiety) 40 tablet 1    nystatin (MYCOSTATIN) 636032 UNIT/GM powder Apply 3 times daily. 60 g 1    ketoconazole (NIZORAL) 2 % cream Apply topically twice daily x 4wks or until improvement is noted. Then as needed. 30 g 1       Tobacco use history updated. Social History     Tobacco Use   Smoking Status Former Smoker    Quit date: 7/30/2011    Years since quitting: 10.5   Smokeless Tobacco Never Used        Nursing note reviewed. Vitals:    02/21/22 1132   BP: 122/82   Site: Left Upper Arm   Position: Sitting   Cuff Size: Medium Adult   Pulse: 97   SpO2: 94%   Weight: 227 lb (103 kg)          BP Readings from Last 3 Encounters:   02/21/22 122/82   06/01/21 120/86   08/21/20 122/70     Wt Readings from Last 3 Encounters:   02/21/22 227 lb (103 kg)   06/01/21 230 lb (104.3 kg)   08/21/20 198 lb 3.2 oz (89.9 kg)     Body mass index is 34.52 kg/m². No results found for this visit on 02/21/22. Physical Exam  Vitals and nursing note reviewed. Constitutional:       General: She is not in acute distress. Appearance: She is well-developed. She is not diaphoretic. HENT:      Head: Normocephalic and atraumatic. Eyes:      Conjunctiva/sclera: Conjunctivae normal.      Pupils: Pupils are equal, round, and reactive to light. Cardiovascular:      Rate and Rhythm: Normal rate and regular rhythm. Heart sounds: Normal heart sounds. No murmur heard. Pulmonary:      Effort: Pulmonary effort is normal. No respiratory distress. Breath sounds: Normal breath sounds. No wheezing. Skin:     General: Skin is warm and dry. Neurological:      Mental Status: She is alert and oriented to person, place, and time. Psychiatric:         Attention and Perception: Attention and perception normal.         Mood and Affect: Mood is anxious and depressed. Speech: Speech normal.         Behavior: Behavior normal.         Thought Content:  Thought content normal.         Cognition and Memory: Cognition and memory normal.         Judgment: Judgment normal.           _________________________________________________  Assessment:     1. Adjustment disorder with depressed mood  -     LORazepam (ATIVAN) 0.5 MG tablet; Take 1 tablet by mouth 2 times daily as needed for Anxiety for up to 30 days. , Disp-60 tablet, R-0Normal  -     [address] [facility], [place], [place]  2. CARINE (generalized anxiety disorder)  -     LORazepam (ATIVAN) 0.5 MG tablet; Take 1 tablet by mouth 2 times daily as needed for Anxiety for up to 30 days. , Disp-60 tablet, R-0Normal  -     [address] [facility], [place] [facility], [place]  3. Agoraphobia with panic attacks  -     LORazepam (ATIVAN) 0.5 MG tablet; Take 1 tablet by mouth 2 times daily as needed for Anxiety for up to 30 days. , Disp-60 tablet, R-0Normal  -     [address] [facility] [place]    He is already on Wellbutrin at 300 daily and we will add rescue Ativan to be used judiciously and only as needed. She understands this is a limited time medication only and does carry risks. She is agreeable to being referred to counseling to help her get through this anniversary time and onward to future days. She is presently not considering getting pregnant again, she has an older child who is doing well. Problems listed above are stable and therapeutic plan is unchanged unless otherwise specified. See orders above and comments below for details of workup or medication orders. _________________________________________________  Plan:     Return in about 2 months (around 4/21/2022), or if symptoms worsen or fail to improve, for recheck on mood.       Electronically signed by Maude Camarena MD on 2/21/22 at 11:42 AM EST

## 2022-02-28 ASSESSMENT — ENCOUNTER SYMPTOMS
BACK PAIN: 0
SHORTNESS OF BREATH: 0
WHEEZING: 0
ABDOMINAL PAIN: 0
COUGH: 0
CHEST TIGHTNESS: 0

## 2022-03-07 ENCOUNTER — TELEPHONE (OUTPATIENT)
Dept: FAMILY MEDICINE CLINIC | Age: 37
End: 2022-03-07

## 2022-03-07 NOTE — TELEPHONE ENCOUNTER
FMLA forms completed by this provider for intermittent leave for anxiety and other symptoms. They will be faxed and copy scanned.

## 2022-03-18 DIAGNOSIS — F40.01 AGORAPHOBIA WITH PANIC ATTACKS: ICD-10-CM

## 2022-03-18 DIAGNOSIS — F43.21 ADJUSTMENT DISORDER WITH DEPRESSED MOOD: ICD-10-CM

## 2022-03-18 DIAGNOSIS — F41.1 GAD (GENERALIZED ANXIETY DISORDER): ICD-10-CM

## 2022-03-20 RX ORDER — LORAZEPAM 0.5 MG/1
0.5 TABLET ORAL 2 TIMES DAILY PRN
Qty: 60 TABLET | Refills: 0 | Status: SHIPPED | OUTPATIENT
Start: 2022-03-20 | End: 2022-04-19

## 2022-03-20 NOTE — TELEPHONE ENCOUNTER
Refilled Ativan twice daily dosing, she is using it twice daily every day, she is advised to decrease it if she can and if she is unable she will need to go to psychiatry who may or may not continue it long-term. Staff is asked to call patient to review this with her.

## 2022-04-07 ENCOUNTER — PATIENT MESSAGE (OUTPATIENT)
Dept: FAMILY MEDICINE CLINIC | Age: 37
End: 2022-04-07

## 2022-04-07 DIAGNOSIS — F43.21 ADJUSTMENT DISORDER WITH DEPRESSED MOOD: ICD-10-CM

## 2022-04-07 DIAGNOSIS — Z87.59 HISTORY OF PREGNANCY LOSS, NOT CURRENTLY PREGNANT: ICD-10-CM

## 2022-04-07 DIAGNOSIS — F41.9 ANXIETY: Primary | ICD-10-CM

## 2022-04-07 DIAGNOSIS — F40.01 AGORAPHOBIA WITH PANIC ATTACKS: ICD-10-CM

## 2022-04-07 DIAGNOSIS — F41.1 GAD (GENERALIZED ANXIETY DISORDER): ICD-10-CM

## 2022-04-07 NOTE — TELEPHONE ENCOUNTER
Patient will be referred to [facility] for long-term anxiety and mood issues. She has required intermittent benzodiazepine and has been appropriate with that use.

## 2022-04-07 NOTE — TELEPHONE ENCOUNTER
From: Viki Madrid  To: Dr. Neeru Moura: 4/7/2022 7:44 AM EDT  Subject: Referral to Psychiatrist     Hi Dr. Ro Romero,    I have decided that I would like to continue taking Ativan as needed. Im doing well with it and a lot of days I dont need it, however Id like the option to be there if the need arises. 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 I understand that requires me to see a psychiatrist, and I would like for you to give me a referral as we previously discussed.     Thank you,  Scott Cornejo

## 2022-04-26 ENCOUNTER — HOSPITAL ENCOUNTER (OUTPATIENT)
Dept: PSYCHIATRY | Age: 37
Setting detail: THERAPIES SERIES
Discharge: HOME | End: 2022-04-26
Payer: COMMERCIAL

## 2022-04-26 VITALS
HEART RATE: 84 BPM | SYSTOLIC BLOOD PRESSURE: 131 MMHG | RESPIRATION RATE: 16 BRPM | OXYGEN SATURATION: 98 % | DIASTOLIC BLOOD PRESSURE: 85 MMHG

## 2022-04-26 DIAGNOSIS — F41.1 GENERALIZED ANXIETY DISORDER: ICD-10-CM

## 2022-04-26 DIAGNOSIS — F41.0 PANIC ATTACKS: ICD-10-CM

## 2022-04-26 DIAGNOSIS — Z76.89 ENCOUNTER TO ESTABLISH CARE: ICD-10-CM

## 2022-04-26 DIAGNOSIS — F33.1 MODERATE EPISODE OF RECURRENT MAJOR DEPRESSIVE DISORDER (HCC): Primary | ICD-10-CM

## 2022-04-26 PROCEDURE — 99204 OFFICE O/P NEW MOD 45 MIN: CPT | Performed by: NURSE PRACTITIONER

## 2022-04-26 PROCEDURE — 90837 PSYTX W PT 60 MINUTES: CPT | Performed by: NURSE PRACTITIONER

## 2022-04-26 NOTE — PROGRESS NOTES
Behavioral Health Consultation  Carl BARON, CNP-BC  4/26/2022, 5:22 PM      Time spent with Patient:  60 minutes  This was a outpatient visit. [facility]    Chief Complaint: anxiety, new patient    [place]:  Reason for visit is intake assessment. She has been compliant with medications. Pt reports that medications have not  been working. Pt denies side effects from medications. Pt denies hallucinations. Pt reports there   Have been no changes to appetite. Sleep: initiation minutes to hours length of time asleep 6 hours, wakes up more than once No feels rested upon waking Yes  Pt denies  current exercise. Pt denies current suicidal ideation, plan and intent. Pt  denies current homicidal ideation, plan and [employer]). History of jessie No History of issues with temper No History of engaging in risky behaviors No Has a first degree relative with bipolar disorder No Endorses anxiety as \"4-5\" and depression \"0\" on a scale of 0 to 10 with 0 being none and 10 being horrible. Social: interacts with son, boyfriend, co-workers, but admit she has become reclusive over past 2 years    Past Psychiatric history:   The patient has a history of  anxiety disorder. Current treatment includes Anti-depressant: Wellbutrin and Anti-anxiety: Ativan. Patient denies from current treatment. Previous treatment has included: Effexor, Trintellix and buspirone. Family Mental Health history:   Pertinent family history: depression and anxiety disorder.     MSE:    Appearance: alert, cooperative, no distress  Attention:Intact  Appetite: normal  Ambulation: within functional limits Yes  Sleep disturbance: No  Loss of pleasure: No  Speech: spontaneous, normal rate and normal volume  Mood: Anxious  Depressed  Affect: anxiety  Thought Content: intact  Insight: Good  Judgment: Intact  Memory: Intact short-term and Long-term impaired  Suicide Assessment: no suicidal ideation  Homicide Assessment: denies current homicidal ideation, plan and intent       History:      Review of Systems:       Current Outpatient Medications:     buPROPion (WELLBUTRIN XL) 300 MG extended release tablet, Take 1 tablet by mouth every morning, Disp: 30 tablet, Rfl: 5    nystatin (MYCOSTATIN) 515001 UNIT/GM powder, Apply 3 times daily. , Disp: 60 g, Rfl: 1    ketoconazole (NIZORAL) 2 % cream, Apply topically twice daily x 4wks or until improvement is noted. Then as needed. , Disp: 30 g, Rfl: 1     PDMP Monitoring:    Last PDMP Dick as Reviewed [facility]):  Review User Review Instant Review Result   Margie Scotter 4/26/2022  3:56 PM Reviewed PDMP [1]     Last Controlled Substance Monitoring Documentation      [facility] - Intake Appointment [facility] 4/26/2022 in [facility]   Periodic Controlled Substance Monitoring No signs of potential drug abuse or diversion identified. filed at 04/26/2022 1556        Urine Drug Screenings (1 yr)    No resulted procedures found. Medication Contract and Consent for Opioid Use Documents Filed      No documents found                 OARRS checked and there were no signs of substance abuse, or prescription misuse. Social History     Socioeconomic History    Marital status:      Spouse name: Not on file    Number of children: Not on file    Years of education: Not on file    Highest education level: Not on file   Occupational History    Not on file   Tobacco Use    Smoking status: Former Smoker     Quit date: 7/30/2011     Years since quitting: 10.7    Smokeless tobacco: Never Used   Substance and Sexual Activity    Alcohol use: Yes     Comment: socially    Drug use: No    Sexual activity: Yes     Comment: Patient was prescribed the pill, but never started it.     Other Topics Concern    Not on file   Social History Narrative    Not on file     Social Determinants of Health     Financial Resource Strain:     Difficulty of Paying Living Expenses: Not on file   Food Insecurity:     Worried About Running Out of Food in the Last Year: Not on file    David of Food in the Last Year: Not on file   Transportation Needs:     Lack of Transportation (Medical): Not on file    Lack of Transportation (Non-Medical): Not on file   Physical Activity:     Days of Exercise per Week: Not on file    Minutes of Exercise per Session: Not on file   Stress:     Feeling of Stress : Not on file   Social Connections:     Frequency of Communication with Friends and Family: Not on file    Frequency of Social Gatherings with Friends and Family: Not on file    Attends Restorationism Services: Not on file    Active Member of [address] [place] [address] [employer] or Organizations: Not on file    Attends Club or Organization Meetings: Not on file    Marital Status: Not on file   Intimate Partner Violence:     Fear of Current or Ex-Partner: Not on file    Emotionally Abused: Not on file    Physically Abused: Not on file    Sexually Abused: Not on file   Housing Stability:     Unable to Pay for Housing in the Last Year: Not on file    Number of [place] in the Last Year: Not on file    Unstable Housing in the Last Year: Not on file       TOBACCO: Jennifer  reports that she quit smoking about 10 years ago. She has never used smokeless tobacco.  ETOH: Jennifer  reports current alcohol use. 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   Family History   Problem Relation Age of Onset    Kidney Disease Mother         kidney transplant    High Blood Pressure Mother     Anxiety Disorder Mother     Diabetes Mother     Diabetes Sister  Lupus Sister     Other Sister         kidney transplant recipient    Kidney Disease Sister     Anxiety Disorder Sister     Depression Sister     Fibromyalgia Sister     Diabetes Sister         type 1    Drug Abuse Father     Breast Cancer Maternal Grandmother     Alzheimer's Disease Maternal Grandmother     No Known Problems Maternal Grandfather     Dementia Paternal Grandmother     No Known Problems Paternal Grandfather     Anxiety Disorder Son    Romelia Donnelly Other Son         seborrheic dermatitis       Last Labs: No results found for: LABA1C  No results found for: EAG   Lab Results   Component Value Date    WBC 6.0 05/22/2018    HGB 13.1 05/22/2018    HCT 36.4 05/22/2018    MCV 82.2 (A) 12/03/2020     05/22/2018    LABLYMP 37.5 07/30/2013    LYMPHOPCT 28.8 12/03/2020    RBC 4.63 12/03/2020    MCH 28.8 12/03/2020    MCHC 35.1 12/03/2020    RDW 14.3 12/03/2020       No results found for: NA, K, CL, CO2, BUN, CREATININE, GLUCOSE, CALCIUM, PROT, LABALBU, BILITOT, ALKPHOS, AST, ALT, LABGLOM, GFRAA, AGRATIO, GLOB  . last    Diagnosis:      1. Moderate episode of recurrent major depressive disorder ([facility] [address]    2. Generalized anxiety disorder    3. Panic attacks    4. Encounter to establish care      Plan:    · Discussed depression and anxiety in depth and detail. · Discussed performing GeneSight testing in order to guide future medical decision making. Patient agreeable. Specimen obtained and sent to lab via [facility] [address]. · Discussed risks and benefits of medications, as well as need for yearly lab work. · Follow up with Rossana Ayala CNP-BC for medication management. · Continue work with PCP to manage medical concerns, and [facility] for continued follow-up.     Pt interventions:    Discussed importance of medication adherence, Discussed risks, benefits, side effects of medication and need for follow up treatment, Provided education on the use of medication to treat  depression and anxiety and Established rapport

## 2022-05-20 ENCOUNTER — HOSPITAL ENCOUNTER (OUTPATIENT)
Dept: PSYCHIATRY | Age: 37
Setting detail: THERAPIES SERIES
Discharge: HOME | End: 2022-05-20
Payer: COMMERCIAL

## 2022-05-20 DIAGNOSIS — E88.89 INTERMEDIATE DRUG METABOLIZER DUE TO CYP2C19 GENE VARIANT (HCC): ICD-10-CM

## 2022-05-20 DIAGNOSIS — F33.1 MODERATE EPISODE OF RECURRENT MAJOR DEPRESSIVE DISORDER (HCC): Primary | ICD-10-CM

## 2022-05-20 DIAGNOSIS — F41.0 PANIC ATTACKS: ICD-10-CM

## 2022-05-20 DIAGNOSIS — E88.89 GREATER THAN NORMAL RESPONSE TO SEROTONIN REUPTAKE INHIBITORS ASSOCIATED WITH GG GENOTYPE OF HTR2A GENE (HCC): ICD-10-CM

## 2022-05-20 DIAGNOSIS — F41.1 GAD (GENERALIZED ANXIETY DISORDER): ICD-10-CM

## 2022-05-20 DIAGNOSIS — E88.89 INTERMEDIATE DRUG METABOLIZER DUE TO CYP2D6 GENE VARIANT (HCC): ICD-10-CM

## 2022-05-20 DIAGNOSIS — Z79.899 MEDICATION MANAGEMENT: ICD-10-CM

## 2022-05-20 DIAGNOSIS — Z15.89 SS GENOTYPE OF 5-HTTLPR REGION OF SLC6A4 GENE: ICD-10-CM

## 2022-05-20 DIAGNOSIS — Z15.89 HETEROZYGOUS MTHFR MUTATION C677T: ICD-10-CM

## 2022-05-20 DIAGNOSIS — E88.89: ICD-10-CM

## 2022-05-20 DIAGNOSIS — Q99.9 GENETIC DEFECT: ICD-10-CM

## 2022-05-20 DIAGNOSIS — Z15.89 CYP1A2 GENE MUTATION: ICD-10-CM

## 2022-05-20 PROCEDURE — 99443 PR PHYS/QHP TELEPHONE EVALUATION 21-30 MIN: CPT | Performed by: NURSE PRACTITIONER

## 2022-05-20 RX ORDER — LORAZEPAM 0.5 MG/1
0.5 TABLET ORAL 3 TIMES DAILY PRN
Qty: 30 TABLET | Refills: 0 | Status: SHIPPED | OUTPATIENT
Start: 2022-05-20 | End: 2022-06-19

## 2022-05-20 RX ORDER — DOXEPIN HYDROCHLORIDE 25 MG/1
25 CAPSULE ORAL NIGHTLY
Qty: 30 CAPSULE | Refills: 1 | Status: SHIPPED | OUTPATIENT
Start: 2022-05-20 | End: 2022-06-28 | Stop reason: SDUPTHER

## 2022-05-20 RX ORDER — BUPROPION HYDROCHLORIDE 300 MG/1
300 TABLET ORAL EVERY MORNING
Qty: 30 TABLET | Refills: 1 | Status: SHIPPED | OUTPATIENT
Start: 2022-05-20 | End: 2022-06-28 | Stop reason: SDUPTHER

## 2022-05-20 RX ORDER — LORAZEPAM 0.5 MG/1
0.5 TABLET ORAL EVERY 6 HOURS PRN
COMMUNITY
End: 2022-05-20 | Stop reason: HOSPADM

## 2022-05-20 NOTE — PROGRESS NOTES
Behavioral Health Consultation Virtual Visit via [address] [place] [address]  5/20/2022, 3:27 PM     James Hodges is a 29 y.o. female evaluated via telephone on 5/20/2022. Start time: 1421  End time: 4101    Consent:  She and/or health care decision maker is aware that that she may receive a bill for this telephone service, depending on her insurance coverage, and has provided verbal consent to proceed: Yes    Documentation:  I communicated with the patient and/or health care decision maker about please see documentation below  . Details of this discussion including any medical advice provided: please see documentation below    I affirm this is a Patient Initiated Episode with a Patient who has not had a related appointment within my department in the past 7 days or scheduled within the next 24 hours. Patient identification was verified at the start of the visit: Yes    Total Time: minutes: 21-30 minutes    The visit was conducted pursuant to the emergency declaration under the [address] [place] [address], [address] [place] [address] authority and the [employer] and [employer] General Act. Patient identification was verified, and a caregiver was present when appropriate. The patient was located in a state where the provider was credentialed to provide care. Note: not billable if this call serves to triage the patient into an appointment for the relevant concern      TOD Mathew CNP     Time spent with Patient:  31 minutes  This was a outpatient visit. Provider Location: [place] [address]    Chief Complaint: anxiety, depression, medication management    [place]:  Reason for visit is medication management   She has been compliant with medications. Pt reports that medications have been working. Pt denies side effects from medications. Pt denies hallucinations. Pt reports there have been no changes to appetite.  Sleep - falls asleep quickly, wakes up 2-3 x a night, sometimes can't go back to sleep, sleeps 5+ hours feels rested most of the time. Pt denies  current exercise. Pt denies current suicidal ideation, plan and intent. Pt  denies current homicidal ideation, plan and [email or web address]). History of jessie No History of issues with temper No History of engaging in risky behaviors No Has a first degree relative with bipolar disorder No Endorses anxiety as \"4-5\" and depression \"2\" on a scale of 0 to 10 with 0 being none and 10 being horrible. Social: interacts with son, boyfriend, co-workers, but admit she has become reclusive over past 2 years    Past Psychiatric history:   The patient has a history of  anxiety disorder. Current treatment includes Anti-depressant: Wellbutrin and Anti-anxiety: Ativan. Patient denies from current treatment. Previous treatment has included: Effexor, Trintellix and buspirone. Family Mental Health history:   Pertinent family history: depression and anxiety disorder. History:      Review of Systems:       Current Outpatient Medications:     doxepin (SINEQUAN) 25 MG capsule, Take 1 capsule by mouth nightly, Disp: 30 capsule, Rfl: 1    buPROPion (WELLBUTRIN XL) 300 MG extended release tablet, Take 1 tablet by mouth every morning, Disp: 30 tablet, Rfl: 1    LORazepam (ATIVAN) 0.5 MG tablet, Take 1 tablet by mouth 3 times daily as needed for Anxiety for up to 30 days. , Disp: 30 tablet, Rfl: 0    nystatin (MYCOSTATIN) 955171 UNIT/GM powder, Apply 3 times daily. , Disp: 60 g, Rfl: 1    ketoconazole (NIZORAL) 2 % cream, Apply topically twice daily x 4wks or until improvement is noted. Then as needed. , Disp: 30 g, Rfl: 1     PDMP Monitoring:    Last PDMP Dick as Reviewed [facility]):  Review User Review Instant Review Result   Estephania Cortez 4/26/2022  3:56 PM Reviewed PDMP [1]     Last Controlled Substance Monitoring Documentation      [facility] - Intake Appointment from 4/26/2022 in [facility]   Periodic Controlled Substance Monitoring No signs of potential drug abuse or diversion identified. filed at 04/26/2022 1556        Urine Drug Screenings (1 yr)    No resulted procedures found. Medication Contract and Consent for Opioid Use Documents Filed      No documents found                 OARRS checked and there were no signs of substance abuse, or prescription misuse. Social History     Socioeconomic History    Marital status:      Spouse name: Not on file    Number of children: Not on file    Years of education: Not on file    Highest education level: Not on file   Occupational History    Not on file   Tobacco Use    Smoking status: Former Smoker     Quit date: 7/30/2011     Years since quitting: 10.8    Smokeless tobacco: Never Used   Substance and Sexual Activity    Alcohol use: Yes     Comment: socially    Drug use: No    Sexual activity: Yes     Comment: Patient was prescribed the pill, but never started it. Other Topics Concern    Not on file   Social History Narrative    Not on file     Social Determinants of Health     Financial Resource Strain:     Difficulty of Paying Living Expenses: Not on file   Food Insecurity:     Worried About Running Out of Food in the Last Year: Not on file    David of Food in the Last Year: Not on file   Transportation Needs:     Lack of Transportation (Medical): Not on file    Lack of Transportation (Non-Medical):  Not on file   Physical Activity:     Days of Exercise per Week: Not on file    Minutes of Exercise per Session: Not on file   Stress:     Feeling of Stress : Not on file   Social Connections:     Frequency of Communication with Friends and Family: Not on file    Frequency of Social Gatherings with Friends and Family: Not on file    Attends Scientology Services: Not on file    Active Member of Clubs or Organizations: Not on file    Attends Club or Organization Meetings: Not on file    Marital Status: Not on file   Intimate Partner Violence:     Fear of Current or Ex-Partner: Not on file    Emotionally Abused: Not on file    Physically Abused: Not on file    Sexually Abused: Not on file   Housing Stability:     Unable to Pay for Housing in the Last Year: Not on file    Number of [place] in the Last Year: Not on file    Unstable Housing in the Last Year: Not on file       TOBACCO: Jennifer  reports that she quit smoking about 10 years ago. She has never used smokeless tobacco.  ETOH: Jennifer  reports current alcohol use. 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   Family History   Problem Relation Age of Onset    Kidney Disease Mother         kidney transplant    High Blood Pressure Mother     Anxiety Disorder Mother     Diabetes Mother     Diabetes Sister    Veronica Me Lupus Sister     Other Sister         kidney transplant recipient   Veronica Me Kidney Disease Sister     Anxiety Disorder Sister     Depression Sister     Fibromyalgia Sister     Diabetes Sister         type 1    Drug Abuse Father     Breast Cancer Maternal Grandmother     Alzheimer's Disease Maternal Grandmother     No Known Problems Maternal Grandfather     Dementia Paternal Grandmother     No Known Problems Paternal Grandfather     Anxiety Disorder Son     Other Son         seborrheic dermatitis       Last Labs: No results found for: LABA1C  No results found for: EAG   Lab Results   Component Value Date    WBC 6.0 05/22/2018    HGB 13.1 05/22/2018    HCT 36.4 05/22/2018    MCV 82.2 (A) 12/03/2020     05/22/2018    LABLYMP 37.5 07/30/2013    LYMPHOPCT 28.8 12/03/2020 RBC 4.63 2020    MCH 28.8 2020    MCHC 35.1 2020    RDW 14.3 2020       No results found for: NA, K, CL, CO2, BUN, CREATININE, GLUCOSE, CALCIUM, PROT, LABALBU, BILITOT, ALKPHOS, AST, ALT, LABGLOM, GFRAA, AGRATIO, GLOB  . last    Diagnosis:      1. Moderate episode of recurrent major depressive disorder ([facility]    2. CARINE (generalized anxiety disorder)    3. Panic attacks    4. Greater than normal response to serotonin reuptake inhibitors associated with GG genotype of HTR2A gene (HCC)    5. 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 CY gene mutation    7. CYP2B6 poor metabolizer ([facility]    8. 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 Heterozygous MTHFR mutation C677T    11. Genetic defect    12. Medication management      Plan:  · Discussed [employer] results in depth and detail with patient. All questions answered. · Continue bupropion  · Start doxepin  · Start Ativan per orders. Patient instructed that will wean off once symptoms controlled  · Discussed risks and benefits of medications, as well as need for yearly lab work. · Follow up with Jessie Benavidez CNP-BC for medication management. · Continue work with PCP to manage medical concerns, and [facility] for continued follow-up. Orders Placed This Encounter   Medications    doxepin (SINEQUAN) 25 MG capsule     Sig: Take 1 capsule by mouth nightly     Dispense:  30 capsule     Refill:  1    buPROPion (WELLBUTRIN XL) 300 MG extended release tablet     Sig: Take 1 tablet by mouth every morning     Dispense:  30 tablet     Refill:  1    LORazepam (ATIVAN) 0.5 MG tablet     Sig: Take 1 tablet by mouth 3 times daily as needed for Anxiety for up to 30 days.      Dispense:  30 tablet     Refill:  0     Pt interventions:    Discussed importance of medication adherence, Discussed risks, benefits, side effects of medication and need for follow up treatment, Provided education on the use of medication to treat  depression and anxiety and Supportive techniques

## 2022-05-22 PROBLEM — Q99.9 GENETIC DEFECT: Status: ACTIVE | Noted: 2022-05-22

## 2022-05-22 PROBLEM — Z15.89 SS GENOTYPE OF 5-HTTLPR REGION OF SLC6A4 GENE: Status: ACTIVE | Noted: 2022-05-22

## 2022-05-22 PROBLEM — Z15.89 HETEROZYGOUS MTHFR MUTATION C677T: Status: ACTIVE | Noted: 2022-05-22

## 2022-05-22 PROBLEM — E88.89 CYP2B6 POOR METABOLIZER (HCC): Status: ACTIVE | Noted: 2022-05-22

## 2022-05-22 PROBLEM — F43.0 STRESS REACTION: Status: ACTIVE | Noted: 2022-05-22

## 2022-05-22 PROBLEM — E88.89 GREATER THAN NORMAL RESPONSE TO SEROTONIN REUPTAKE INHIBITORS ASSOCIATED WITH GG GENOTYPE OF HTR2A GENE (HCC): Status: ACTIVE | Noted: 2022-05-22

## 2022-05-22 PROBLEM — Z15.89 CYP1A2 GENE MUTATION: Status: ACTIVE | Noted: 2022-05-22

## 2022-05-22 PROBLEM — E88.89 INTERMEDIATE DRUG METABOLIZER DUE TO CYP2D6 GENE VARIANT (HCC): Status: ACTIVE | Noted: 2022-05-22

## 2022-05-22 PROBLEM — E88.89 INTERMEDIATE DRUG METABOLIZER DUE TO CYP2C19 GENE VARIANT (HCC): Status: ACTIVE | Noted: 2022-05-22

## 2022-05-23 LAB
HPV REFLEX: NORMAL
INTERPRETATION: NORMAL
INTERPRETATION: NORMAL
LAB AP CASE REPORT: NORMAL
SPECIMEN ADEQUACY:: NORMAL

## 2022-06-28 ENCOUNTER — HOSPITAL ENCOUNTER (OUTPATIENT)
Dept: PSYCHIATRY | Age: 37
Setting detail: THERAPIES SERIES
Discharge: HOME | End: 2022-06-28
Payer: COMMERCIAL

## 2022-06-28 DIAGNOSIS — F41.1 GAD (GENERALIZED ANXIETY DISORDER): ICD-10-CM

## 2022-06-28 DIAGNOSIS — F33.1 MODERATE EPISODE OF RECURRENT MAJOR DEPRESSIVE DISORDER (HCC): Primary | ICD-10-CM

## 2022-06-28 DIAGNOSIS — Z79.899 MEDICATION MANAGEMENT: ICD-10-CM

## 2022-06-28 PROCEDURE — 99442 PR PHYS/QHP TELEPHONE EVALUATION 11-20 MIN: CPT | Performed by: NURSE PRACTITIONER

## 2022-06-28 RX ORDER — LORAZEPAM 0.5 MG/1
0.5 TABLET ORAL EVERY 8 HOURS PRN
Qty: 30 TABLET | Refills: 0 | Status: SHIPPED | OUTPATIENT
Start: 2022-06-28 | End: 2022-07-28

## 2022-06-28 RX ORDER — BUPROPION HYDROCHLORIDE 300 MG/1
300 TABLET ORAL EVERY MORNING
Qty: 30 TABLET | Refills: 2 | Status: SHIPPED | OUTPATIENT
Start: 2022-06-28 | End: 2022-08-25 | Stop reason: SDUPTHER

## 2022-06-28 RX ORDER — DOXEPIN HYDROCHLORIDE 10 MG/1
10 CAPSULE ORAL NIGHTLY PRN
Qty: 30 CAPSULE | Refills: 2 | Status: SHIPPED | OUTPATIENT
Start: 2022-06-28 | End: 2022-08-25 | Stop reason: SDUPTHER

## 2022-06-28 NOTE — PROGRESS NOTES
Behavioral Health Consultation Virtual Visit via [address] [place] [address]  6/30/2022, 2:57 PM     Shayd Beltrán is a [address] y.o. female evaluated via telephone on 6/28/2022. Start time: 6550  End time: 0759    Consent:  She and/or health care decision maker is aware that that she may receive a bill for this telephone service, depending on her insurance coverage, and has provided verbal consent to proceed: Yes    I affirm this is a Patient Initiated Episode with a Patient who has not had a related appointment within my department in the past 7 days or scheduled within the next 24 hours. Patient identification was verified at the start of the visit: Yes    Total Time: minutes: 11-20 minutes    The visit was conducted pursuant to the emergency declaration under the [facility], [facility] authority and the [employer] General Act. Patient identification was verified, and a caregiver was present when appropriate. The patient was located in a state where the provider was credentialed to provide care. Note: not billable if this call serves to triage the patient into an appointment for the relevant concern      TOD Kumar CNP     Time spent with Patient:  14 minutes  This was a outpatient visit. Provider Location: [place] [address]    Chief Complaint: depression, anxiety, medication management    [place]:  Reason for visit is medication management   She has been compliant with medications. Pt reports that medications have been working. Pt admits to side effects from medications - groggy after waking on doxepin 25 mg HS. Pt denies hallucinations. Pt reports there have been no changes to appetite. Sleep - falls asleep quickly, wakes up once a night, sleep, sleeps 6 hours feels rested most of the time. Needs a nap midday. Pt denies  current exercise.  Pt denies current suicidal Social History     Socioeconomic History    Marital status:      Spouse name: Not on file    Number of children: Not on file    Years of education: Not on file    Highest education level: Not on file   Occupational History    Not on file   Tobacco Use    Smoking status: Former Smoker     Quit date: 7/30/2011     Years since quitting: 10.9    Smokeless tobacco: Never Used   Substance and Sexual Activity    Alcohol use: Yes     Comment: socially    Drug use: No    Sexual activity: Yes     Comment: Patient was prescribed the pill, but never started it. Other Topics Concern    Not on file   Social History Narrative    Not on file     Social Determinants of Health     Financial Resource Strain:     Difficulty of Paying Living Expenses: Not on file   Food Insecurity:     Worried About Running Out of Food in the Last Year: Not on file    David of Food in the Last Year: Not on file   Transportation Needs:     Lack of Transportation (Medical): Not on file    Lack of Transportation (Non-Medical):  Not on file   Physical Activity:     Days of Exercise per Week: Not on file    Minutes of Exercise per Session: Not on file   Stress:     Feeling of Stress : Not on file   Social Connections:     Frequency of Communication with Friends and Family: Not on file    Frequency of Social Gatherings with Friends and Family: Not on file    Attends Zoroastrianism Services: Not on file    Active Member of [address] [place] [address] or Organizations: Not on file    Attends Club or Organization Meetings: Not on file    Marital Status: Not on file   Intimate Partner Violence:     Fear of Current or Ex-Partner: Not on file    Emotionally Abused: Not on file    Physically Abused: Not on file    Sexually Abused: Not on file   Housing Stability:     Unable to Pay for Housing in the Last Year: Not on file    Number of [place] in the Last Year: Not on file    Unstable Housing in the Last Year: Not on file       TOBACCO: Jennifer reports that she quit smoking about 10 years ago. She has never used smokeless tobacco.  ETOH: Jennifer  reports current alcohol use. 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 Family History   Problem Relation Age of Onset    Kidney Disease Mother         kidney transplant    High Blood Pressure Mother     Anxiety Disorder Mother     Diabetes Mother     Diabetes Sister    Yoon Lupus Sister     Other Sister         kidney transplant recipient   Yoon Kidney Disease Sister     Anxiety Disorder Sister     Depression Sister     Fibromyalgia Sister     Diabetes Sister         type 1    Drug Abuse Father     Breast Cancer Maternal Grandmother     Alzheimer's Disease Maternal Grandmother     No Known Problems Maternal Grandfather     Dementia Paternal Grandmother     No Known Problems Paternal Grandfather     Anxiety Disorder Son    Yoon Other Son         seborrheic dermatitis       Last Labs: No results found for: LABA1C  No results found for: EAG   Lab Results   Component Value Date    WBC 6.0 05/22/2018    HGB 13.1 05/22/2018    HCT 36.4 05/22/2018    MCV 82.2 (A) 12/03/2020     05/22/2018    LABLYMP 37.5 07/30/2013    LYMPHOPCT 28.8 12/03/2020    RBC 4.63 12/03/2020    MCH 28.8 12/03/2020    MCHC 35.1 12/03/2020    RDW 14.3 12/03/2020       No results found for: NA, K, CL, CO2, BUN, CREATININE, GLUCOSE, CALCIUM, PROT, LABALBU, BILITOT, ALKPHOS, AST, ALT, LABGLOM, GFRAA, AGRATIO, GLOB  . last    Diagnosis:      1. Moderate episode of recurrent major depressive disorder ([facility]    2. CARINE (generalized anxiety disorder)    3.  Medication management      Plan:    · Discussed current medication regimen. · Discussed risks and benefits of medications, as well as need for yearly lab work. · Decrease doxepin from 25 mg to 10 mg HS  · Continue rest of present medications  · Follow up with Asael Mayer CNP-BC for medication maagement. · Continue work with PCP to manage medical concerns, and [facility] for continued follow-up. Orders Placed This Encounter   Procedures    Discharge patient     Standing Status:   Standing     Number of Occurrences:   1     Order Specific Question:   Discharge Disposition     Answer:   Home      Orders Placed This Encounter   Medications    buPROPion (WELLBUTRIN XL) 300 MG extended release tablet     Sig: Take 1 tablet by mouth every morning     Dispense:  30 tablet     Refill:  2    doxepin (SINEQUAN) 10 MG capsule     Sig: Take 1 capsule by mouth nightly as needed (insomnia)     Dispense:  30 capsule     Refill:  2    LORazepam (ATIVAN) 0.5 MG tablet     Sig: Take 1 tablet by mouth every 8 hours as needed for Anxiety for up to 30 days.      Dispense:  30 tablet     Refill:  0       Pt interventions:    Discussed importance of medication adherence, Discussed risks, benefits, side effects of medication and need for follow up treatment and Supportive techniques

## 2022-08-09 ENCOUNTER — TELEPHONE (OUTPATIENT)
Dept: FAMILY MEDICINE CLINIC | Age: 37
End: 2022-08-09

## 2022-08-09 NOTE — TELEPHONE ENCOUNTER
Rashida Novoa MD  [facility] [address]  Patient was in ER with anxiety overnight. Please call her and see if she is still doing okay today and make sure she is okay seeing psychiatry next month late as planned. If she wants us to see her earlier we can work her into a freeze thaw.      pH

## 2022-08-25 ENCOUNTER — HOSPITAL ENCOUNTER (OUTPATIENT)
Dept: PSYCHIATRY | Age: 37
Setting detail: THERAPIES SERIES
Discharge: HOME | End: 2022-08-25
Payer: COMMERCIAL

## 2022-08-25 VITALS — SYSTOLIC BLOOD PRESSURE: 122 MMHG | DIASTOLIC BLOOD PRESSURE: 78 MMHG | HEART RATE: 87 BPM

## 2022-08-25 DIAGNOSIS — F33.1 MODERATE EPISODE OF RECURRENT MAJOR DEPRESSIVE DISORDER (HCC): Primary | ICD-10-CM

## 2022-08-25 DIAGNOSIS — F41.1 GAD (GENERALIZED ANXIETY DISORDER): ICD-10-CM

## 2022-08-25 DIAGNOSIS — Z79.899 MEDICATION MANAGEMENT: ICD-10-CM

## 2022-08-25 PROCEDURE — 99214 OFFICE O/P EST MOD 30 MIN: CPT | Performed by: NURSE PRACTITIONER

## 2022-08-25 RX ORDER — BUPROPION HYDROCHLORIDE 300 MG/1
300 TABLET ORAL EVERY MORNING
Qty: 30 TABLET | Refills: 2 | Status: SHIPPED | OUTPATIENT
Start: 2022-08-25 | End: 2022-09-28 | Stop reason: SDUPTHER

## 2022-08-25 RX ORDER — BUSPIRONE HYDROCHLORIDE 10 MG/1
10 TABLET ORAL 3 TIMES DAILY PRN
Qty: 90 TABLET | Refills: 2 | Status: SHIPPED | OUTPATIENT
Start: 2022-08-25 | End: 2022-09-24

## 2022-08-25 RX ORDER — DOXEPIN HYDROCHLORIDE 10 MG/1
10 CAPSULE ORAL NIGHTLY PRN
Qty: 30 CAPSULE | Refills: 2 | Status: SHIPPED | OUTPATIENT
Start: 2022-08-25 | End: 2022-09-28 | Stop reason: SDUPTHER

## 2022-08-25 NOTE — PROGRESS NOTES
Behavioral Health Consultation  Jeffry BARON, CNP-BC  8/25/2022, 8:36 AM     Sasha Ramachandran is a 29 y.o. female evaluated in person on 8/25/2022. Chief Complaint: depression, anxiety, medication management    [place]:  Reason for visit is medication management   She has been compliant with medications. Pt reports that medications have been working. Pt denies to side effects from medications. Pt denies hallucinations. Pt reports there have been no changes to appetite. Sleep - falls asleep quickly, wakes up once a night, sleep, sleeps 6 hours feels rested upon waking. Pt denies  current exercise. Pt denies current suicidal ideation, plan and intent. Pt  denies current homicidal ideation, plan and [email or web address]). History of jessie No History of issues with temper No History of engaging in risky behaviors No Has a first degree relative with bipolar disorder No Endorses anxiety as \"10\" and depression \"3\" on a scale of 0 to 10 with 0 being none and 10 being horrible. Recently got . Having stressors with her family. Social: interacts with son, boyfriend, co-workers, but admit she has become reclusive over past 2 years, but pushing herself to be more social     Past Psychiatric history:   The patient has a history of  anxiety disorder. Current treatment includes Anti-depressant: Wellbutrin and Anti-anxiety: Ativan. Patient denies from current treatment. Previous treatment has included: Effexor, Trintellix and buspirone. Family Mental Health history:   Pertinent family history: depression and anxiety disorder.        Current Outpatient Medications:     buPROPion (WELLBUTRIN XL) 300 MG extended release tablet, Take 1 tablet by mouth every morning, Disp: 30 tablet, Rfl: 2    doxepin (SINEQUAN) 10 MG capsule, Take 1 capsule by mouth nightly as needed (insomnia), Disp: 30 capsule, Rfl: 2    busPIRone (BUSPAR) 10 MG tablet, Take 1 tablet by mouth 3 times daily as needed (anxiety), Disp: 90 tablet, Rfl: 2     PDMP Monitoring:    Last PDMP Dick as Reviewed [facility]):  Review User Review Instant Review Result   Dionisio Stringer 2022  3:45 PM Reviewed PDMP [1]     Last Controlled Substance Monitoring Documentation        VV Mychart Visit from 2022 in [facility]   Periodic Controlled Substance Monitoring No signs of potential drug abuse or diversion identified. filed at 2022 1545          Urine Drug Screenings (1 yr)    No resulted procedures found. Medication Contract and Consent for Opioid Use Documents Filed        No documents found                     OARRS checked and there were no signs of substance abuse, or prescription misuse. Social History     Socioeconomic History    Marital status:      Spouse name: Not on file    Number of children: Not on file    Years of education: Not on file    Highest education level: Not on file   Occupational History    Not on file   Tobacco Use    Smoking status: Former     Types: Cigarettes     Quit date: 2011     Years since quittin.0    Smokeless tobacco: Never   Substance and Sexual Activity    Alcohol use: Yes     Comment: socially    Drug use: No    Sexual activity: Yes     Comment: Patient was prescribed the pill, but never started it. Other Topics Concern    Not on file   Social History Narrative    Not on file     Social Determinants of Health     Financial Resource Strain: Not on file   Food Insecurity: Not on file   Transportation Needs: Not on file   Physical Activity: Not on file   Stress: Not on file   Social Connections: Not on file   Intimate Partner Violence: Not on file   Housing Stability: Not on file       TOBACCO: Jennifer  reports that she quit smoking about 11 years ago. She has never used smokeless tobacco.  ETOH: Jennifer  reports current alcohol use.     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 Family History   Problem Relation Age of Onset    Kidney Disease Mother         kidney transplant    High Blood Pressure Mother     Anxiety Disorder Mother     Diabetes Mother     Diabetes Sister     Lupus Sister     Other Sister         kidney transplant recipient    Kidney Disease Sister     Anxiety Disorder Sister     Depression Sister     Fibromyalgia Sister     Diabetes Sister         type 1    Drug Abuse Father     Breast Cancer Maternal Grandmother     Alzheimer's Disease Maternal Grandmother     No Known Problems Maternal Grandfather     Dementia Paternal Grandmother     No Known Problems Paternal Grandfather     Anxiety Disorder Son     Other Son         seborrheic dermatitis       Last Labs: No results found for: LABA1C  No results found for: EAG   Lab Results   Component Value Date    WBC 6.0 05/22/2018    HGB 13.1 05/22/2018    HCT 36.4 05/22/2018    MCV 82.2 (A) 12/03/2020     05/22/2018    LABLYMP 37.5 07/30/2013    LYMPHOPCT 28.8 12/03/2020    RBC 4.63 12/03/2020    MCH 28.8 12/03/2020    MCHC 35.1 12/03/2020    RDW 14.3 12/03/2020       No results found for: NA, K, CL, CO2, BUN, CREATININE, GLUCOSE, CALCIUM, PROT, LABALBU, BILITOT, ALKPHOS, AST, ALT, LABGLOM, GFRAA, AGRATIO, GLOB  . last    Diagnosis:      1. Moderate episode of recurrent major depressive disorder ([facility] [address]    2. CARINE (generalized anxiety disorder)    3. Medication management      Plan:    Discussed current medication regimen. Discussed risks and benefits of medications, as well as need for yearly lab work. Continue doxepin 10 mg HS  Continue rest of present medications  Advised patient to start seeing a counselor to address various stressors. Patient agreeable.   Follow up with Porfirio Granados CNP-BC for medication maagement. Continue work with PCP to manage medical concerns, and [facility] for continued follow-up.       Orders Placed This Encounter   Procedures    Discharge patient     Standing Status:   Standing     Number of Occurrences:   1     Order Specific Question:   Discharge Disposition     Answer:   Home      Orders Placed This Encounter   Medications    buPROPion (WELLBUTRIN XL) 300 MG extended release tablet     Sig: Take 1 tablet by mouth every morning     Dispense:  30 tablet     Refill:  2    doxepin (SINEQUAN) 10 MG capsule     Sig: Take 1 capsule by mouth nightly as needed (insomnia)     Dispense:  30 capsule     Refill:  2    busPIRone (BUSPAR) 10 MG tablet     Sig: Take 1 tablet by mouth 3 times daily as needed (anxiety)     Dispense:  90 tablet     Refill:  2       Pt interventions:    Discussed importance of medication adherence, Discussed risks, benefits, side effects of medication and need for follow up treatment and Supportive techniques

## 2022-09-07 ENCOUNTER — TELEMEDICINE (OUTPATIENT)
Dept: PSYCHOLOGY | Age: 37
End: 2022-09-07

## 2022-09-07 DIAGNOSIS — F41.0 GENERALIZED ANXIETY DISORDER WITH PANIC ATTACKS: Primary | ICD-10-CM

## 2022-09-07 DIAGNOSIS — F41.1 GENERALIZED ANXIETY DISORDER WITH PANIC ATTACKS: Primary | ICD-10-CM

## 2022-09-07 PROCEDURE — 90791 PSYCH DIAGNOSTIC EVALUATION: CPT | Performed by: PSYCHOLOGIST
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
## 2022-09-28 ENCOUNTER — HOSPITAL ENCOUNTER (OUTPATIENT)
Dept: PSYCHIATRY | Age: 37
Setting detail: THERAPIES SERIES
Discharge: HOME | End: 2022-09-28
Payer: COMMERCIAL

## 2022-09-28 DIAGNOSIS — F33.1 MODERATE EPISODE OF RECURRENT MAJOR DEPRESSIVE DISORDER (HCC): Primary | ICD-10-CM

## 2022-09-28 DIAGNOSIS — F41.1 GAD (GENERALIZED ANXIETY DISORDER): ICD-10-CM

## 2022-09-28 DIAGNOSIS — F41.0 PANIC ATTACKS: ICD-10-CM

## 2022-09-28 DIAGNOSIS — Z79.899 MEDICATION MANAGEMENT: ICD-10-CM

## 2022-09-28 PROCEDURE — 99443 PR PHYS/QHP TELEPHONE EVALUATION 21-30 MIN: CPT | Performed by: NURSE PRACTITIONER

## 2022-09-28 RX ORDER — BUPROPION HYDROCHLORIDE 300 MG/1
300 TABLET ORAL EVERY MORNING
Qty: 30 TABLET | Refills: 2 | Status: SHIPPED | OUTPATIENT
Start: 2022-09-28 | End: 2022-10-28 | Stop reason: SDUPTHER

## 2022-09-28 RX ORDER — DOXEPIN HYDROCHLORIDE 10 MG/1
10 CAPSULE ORAL NIGHTLY PRN
Qty: 30 CAPSULE | Refills: 1 | Status: SHIPPED | OUTPATIENT
Start: 2022-09-28 | End: 2022-10-28 | Stop reason: SDUPTHER

## 2022-09-28 RX ORDER — VILAZODONE HYDROCHLORIDE 10 MG/1
10 TABLET ORAL DAILY
Qty: 30 TABLET | Refills: 1 | Status: SHIPPED | OUTPATIENT
Start: 2022-09-28 | End: 2022-10-28 | Stop reason: SDUPTHER

## 2022-09-28 RX ORDER — BUSPIRONE HYDROCHLORIDE 10 MG/1
10 TABLET ORAL 2 TIMES DAILY PRN
COMMUNITY
End: 2022-10-28 | Stop reason: HOSPADM

## 2022-09-28 NOTE — PROGRESS NOTES
Behavioral Health Consultation Virtual Visit via [address] [place] [address]  9/28/2022, 3:32 PM    Sasha Ramachandran is a 29 y.o. female evaluated via telephone on 9/28/2022. Start time: 1530  End time: 9955    Consent:  She and/or health care decision maker is aware that that she may receive a bill for this telephone service, depending on her insurance coverage, and has provided verbal consent to proceed: Yes    I affirm this is a Patient Initiated Episode with a Patient who has not had a related appointment within my department in the past 7 days or scheduled within the next 24 hours. Patient identification was verified at the start of the visit: Yes    Total Time: minutes: 21-30 minutes    The visit was conducted pursuant to the emergency declaration under the [address], [address] [place] [address] authority and the [employer] and [employer] Act. Patient identification was verified, and a caregiver was present when appropriate. The patient was located in a state where the provider was credentialed to provide care. Note: not billable if this call serves to triage the patient into an appointment for the relevant concern    TOD Angel - CNP      Sasha Ramachandran is a 29 y.o. female evaluated via telephone on 9/28/2022. Chief Complaint: depression, anxiety, medication management     [place]:  Reason for visit is medication management. She has been compliant with medications. Pt reports that medications have been working fairly well. Pt denies to side effects from medications. Pt denies hallucinations. Pt reports there have been no changes to appetite. Sleep - falls asleep quickly, wakes up once a night, sleep, sleeps 6 hours feels rested upon waking. Pt denies  current exercise. Pt denies current suicidal ideation, plan and intent. Pt  denies current homicidal ideation, plan and [email or web address]).  History of jessie No History of issues with temper No History of engaging in risky behaviors No Has a first degree relative with bipolar disorder No Endorses anxiety as \"8\" and depression \"3-4\" on a scale of 0 to 10 with 0 being none and 10 being horrible. Recently got . Stressors with her family improving. Taking buspirone with some benefit. Went to her first counseling appointment. Having some intrusive thoughts. Social: interacts with son, boyfriend, co-workers, but admit she has become reclusive over past 2 years, but pushing herself to be more social     Past Psychiatric history:   The patient has a history of  anxiety disorder. Current treatment includes Anti-depressant: Wellbutrin and Anti-anxiety: Ativan. Patient denies from current treatment. Previous treatment has included: Effexor, Trintellix and buspirone. Family Mental Health history:   Pertinent family history: depression and anxiety disorder.      Current Outpatient Medications:     buPROPion (WELLBUTRIN XL) 300 MG extended release tablet, Take 1 tablet by mouth every morning, Disp: 30 tablet, Rfl: 2    doxepin (SINEQUAN) 10 MG capsule, Take 1 capsule by mouth nightly as needed (insomnia), Disp: 30 capsule, Rfl: 2    busPIRone (BUSPAR) 10 MG tablet, Take 1 tablet by mouth 3 times daily as needed (anxiety), Disp: 90 tablet, Rfl: 2       Current Outpatient Medications:     busPIRone (BUSPAR) 10 MG tablet, Take 10 mg by mouth 2 times daily as needed, Disp: , Rfl:     vilazodone HCl (VIIBRYD) 10 MG TABS, Take 1 tablet by mouth daily, Disp: 30 tablet, Rfl: 1    buPROPion (WELLBUTRIN XL) 300 MG extended release tablet, Take 1 tablet by mouth every morning, Disp: 30 tablet, Rfl: 2    doxepin (SINEQUAN) 10 MG capsule, Take 1 capsule by mouth nightly as needed (insomnia), Disp: 30 capsule, Rfl: 1     PDMP Monitoring:    Last PDMP Dick as Reviewed [facility]):  Review User Review Instant Review Result   Ariel Arceo 6/28/2022  3:45 PM Reviewed PDMP [1]     Last Controlled Substance Monitoring Documentation      Flowsheet Row [facility]V Mychart [facility]22 in [facility]   Periodic Controlled Substance Monitoring No signs of potential drug abuse or diversion identified. filed at 2022 1545          Urine Drug Screenings (1 yr)    No resulted procedures found. Medication Contract and Consent for Opioid Use Documents Filed        No documents found                     OARRS checked and there  patient is not on a controlled substance  signs of substance abuse, or prescription misuse. Social History     Socioeconomic History    Marital status:      Spouse name: Not on file    Number of children: Not on file    Years of education: Not on file    Highest education level: Not on file   Occupational History    Not on file   Tobacco Use    Smoking status: Former     Types: Cigarettes     Quit date: 2011     Years since quittin.1    Smokeless tobacco: Never   Substance and Sexual Activity    Alcohol use: Yes     Comment: socially    Drug use: No    Sexual activity: Yes     Comment: Patient was prescribed the pill, but never started it. Other Topics Concern    Not on file   Social History Narrative    Not on file     Social Determinants of Health     Financial Resource Strain: Not on file   Food Insecurity: Not on file   Transportation Needs: Not on file   Physical Activity: Not on file   Stress: Not on file   Social Connections: Not on file   Intimate Partner Violence: Not on file   Housing Stability: Not on file       TOBACCO: Jennifer  reports that she quit smoking about 11 years ago. She has never used smokeless tobacco.  ETOH: Jennifer  reports current alcohol use.     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 Family History   Problem Relation Age of Onset    Kidney Disease Mother         kidney transplant    High Blood Pressure Mother     Anxiety Disorder Mother     Diabetes Mother     Diabetes Sister     Lupus Sister     Other Sister         kidney transplant recipient    Kidney Disease Sister     Anxiety Disorder Sister     Depression Sister     Fibromyalgia Sister     Diabetes Sister         type 1    Drug Abuse Father     Breast Cancer Maternal Grandmother     Alzheimer's Disease Maternal Grandmother     No Known Problems Maternal Grandfather     Dementia Paternal Grandmother     No Known Problems Paternal Grandfather     Anxiety Disorder Son     Other Son         seborrheic dermatitis       Last Labs: No results found for: LABA1C  No results found for: EAG   Lab Results   Component Value Date    WBC 6.0 05/22/2018    HGB 13.1 05/22/2018    HCT 36.4 05/22/2018    MCV 82.2 (A) 12/03/2020     05/22/2018    LABLYMP 37.5 07/30/2013    LYMPHOPCT 28.8 12/03/2020    RBC 4.63 12/03/2020    MCH 28.8 12/03/2020    MCHC 35.1 12/03/2020    RDW 14.3 12/03/2020       No results found for: NA, K, CL, CO2, BUN, CREATININE, GLUCOSE, CALCIUM, PROT, LABALBU, BILITOT, ALKPHOS, AST, ALT, LABGLOM, GFRAA, AGRATIO, GLOB  . last    Diagnosis:      1. Moderate episode of recurrent major depressive disorder ([facility] [address]    2. CARINE (generalized anxiety disorder)    3. Panic attacks    4. Medication management      Plan:    Discussed current medication regimen. Discussed risks and benefits of medications, as well as need for yearly lab work. Continue bupropion, buspirone and doxepin  Start vilazodone 10 mg daily to target anxiety and depression  Follow up with Abigail [facility] for medication management in 1 month  Continue work with PCP to manage medical concerns, and [facility] for continued follow-up.     Orders Placed This Encounter   Procedures    Discharge patient     Standing Status:   Standing     Number of Occurrences:   1     Order Specific Question:   Discharge Disposition     Answer:   Home        Orders Placed This Encounter   Medications    vilazodone HCl (VIIBRYD) 10 MG TABS     Sig: Take 1 tablet by mouth daily     Dispense:  30 tablet     Refill:  1    buPROPion (WELLBUTRIN XL) 300 MG extended release tablet     Sig: Take 1 tablet by mouth every morning     Dispense:  30 tablet     Refill:  2    doxepin (SINEQUAN) 10 MG capsule     Sig: Take 1 capsule by mouth nightly as needed (insomnia)     Dispense:  30 capsule     Refill:  1     Pt interventions:    Discussed importance of medication adherence, Discussed risks, benefits, side effects of medication and need for follow up treatment, and Supportive techniques

## 2022-10-28 ENCOUNTER — HOSPITAL ENCOUNTER (OUTPATIENT)
Dept: PSYCHIATRY | Age: 37
Setting detail: THERAPIES SERIES
Discharge: HOME | End: 2022-10-28
Payer: COMMERCIAL

## 2022-10-28 DIAGNOSIS — F41.0 PANIC ATTACKS: ICD-10-CM

## 2022-10-28 DIAGNOSIS — F33.1 MODERATE EPISODE OF RECURRENT MAJOR DEPRESSIVE DISORDER (HCC): Primary | ICD-10-CM

## 2022-10-28 DIAGNOSIS — F41.1 GAD (GENERALIZED ANXIETY DISORDER): ICD-10-CM

## 2022-10-28 DIAGNOSIS — Z79.899 MEDICATION MANAGEMENT: ICD-10-CM

## 2022-10-28 PROCEDURE — 99442 PR PHYS/QHP TELEPHONE EVALUATION 11-20 MIN: CPT | Performed by: NURSE PRACTITIONER

## 2022-10-28 RX ORDER — BUPROPION HYDROCHLORIDE 300 MG/1
300 TABLET ORAL EVERY MORNING
Qty: 30 TABLET | Refills: 5 | Status: SHIPPED | OUTPATIENT
Start: 2022-10-28

## 2022-10-28 RX ORDER — CLONAZEPAM 0.5 MG/1
0.5 TABLET ORAL 2 TIMES DAILY
Qty: 60 TABLET | Refills: 2 | Status: SHIPPED | OUTPATIENT
Start: 2022-10-28 | End: 2023-01-26

## 2022-10-28 RX ORDER — VILAZODONE HYDROCHLORIDE 20 MG/1
20 TABLET ORAL DAILY
Qty: 30 TABLET | Refills: 5 | Status: SHIPPED | OUTPATIENT
Start: 2022-10-28

## 2022-10-28 RX ORDER — DOXEPIN HYDROCHLORIDE 10 MG/1
10 CAPSULE ORAL NIGHTLY PRN
Qty: 30 CAPSULE | Refills: 5 | Status: SHIPPED | OUTPATIENT
Start: 2022-10-28

## 2022-10-28 NOTE — PROGRESS NOTES
Behavioral Health Consultation Virtual Visit via [address] [place] [address]  10/28/2022, 11:43 AM    Gurvinder Gordillo is a 29 y.o. female evaluated via telephone on 10/28/2022. Start time: 1143  End time: 1200    Consent:  She and/or health care decision maker is aware that that she may receive a bill for this telephone service, depending on her insurance coverage, and has provided verbal consent to proceed: Yes    I affirm this is a Patient Initiated Episode with a Patient who has not had a related appointment within my department in the past 7 days or scheduled within the next 24 hours. Patient identification was verified at the start of the visit: Yes    Total Time: minutes: 11-20 minutes    The visit was conducted pursuant to the emergency declaration under the [facility], [address] [place] [address] authority and the [employer] Act. Patient identification was verified, and a caregiver was present when appropriate. The patient was located in a state where the provider was credentialed to provide care. Note: not billable if this call serves to triage the patient into an appointment for the relevant concern    TOD Garner - CNP      Gurvinder Gordillo is a 29 y.o. female evaluated via telephone on 10/28/2022. Chief Complaint: depression, anxiety, panic attacks, medication management     [place]:  Reason for visit is medication management. She has been compliant with medications. Pt reports that medications have been working fairly well. Pt denies to side effects from medications. Pt denies hallucinations. Pt reports there have been no changes to appetite. Sleep - falls asleep quickly, wakes up once a night, sleep, sleeps 6 hours feels rested upon waking. Pt denies  current exercise. Pt denies current suicidal ideation, plan and intent. Pt  denies current homicidal ideation, plan and [email or web address]).  History of jessie No History of issues with temper No History of engaging in risky behaviors No Has a first degree relative with bipolar disorder No Endorses anxiety as \"8\" and depression \"1\" on a scale of 0 to 10 with 0 being none and 10 being horrible. Recently got . Stressors with her family improving. Taking buspirone with some benefit. Went to her first counseling appointment. Social: interacts with son, boyfriend, co-workers, but admit she has become reclusive over past 2 years, but pushing herself to be more social     Past Psychiatric history:   The patient has a history of  anxiety disorder. Current treatment includes Anti-depressant: Wellbutrin and Anti-anxiety: Ativan. Patient denies from current treatment. Previous treatment has included: Effexor, Trintellix and buspirone. Family Mental Health history:   Pertinent family history: depression and anxiety disorder. Current Outpatient Medications:     buPROPion (WELLBUTRIN XL) 300 MG extended release tablet, Take 1 tablet by mouth every morning, Disp: 30 tablet, Rfl: 2    doxepin (SINEQUAN) 10 MG capsule, Take 1 capsule by mouth nightly as needed (insomnia), Disp: 30 capsule, Rfl: 2    busPIRone (BUSPAR) 10 MG tablet, Take 1 tablet by mouth 3 times daily as needed (anxiety), Disp: 90 tablet, Rfl: 2       Current Outpatient Medications:     buPROPion (WELLBUTRIN XL) 300 MG extended release tablet, Take 1 tablet by mouth every morning, Disp: 30 tablet, Rfl: 5    doxepin (SINEQUAN) 10 MG capsule, Take 1 capsule by mouth nightly as needed (insomnia), Disp: 30 capsule, Rfl: 5    vilazodone HCl (VIIBRYD) 20 MG TABS, Take 1 tablet by mouth daily, Disp: 30 tablet, Rfl: 5    clonazePAM (KLONOPIN) 0.5 MG tablet, Take 1 tablet by mouth 2 times daily for 90 days. , Disp: 60 tablet, Rfl: 2     PDMP Monitoring:    Last PDMP Dick as Reviewed [facility]):  Review User Review Instant Review Result   JESSIE STILES 10/28/2022 10:36 AM Reviewed PDMP [1]     Last Controlled Substance Monitoring Documentation      Flowsheet Row VV Mychart Visit from 2022 in [facility]   Periodic Controlled Substance Monitoring No signs of potential drug abuse or diversion identified. filed at 2022 1545          Urine Drug Screenings (1 yr)    No resulted procedures found. Medication Contract and Consent for Opioid Use Documents Filed        No documents found                     [place]AR[place] checked and there  patient is not on a controlled substance  signs of substance abuse, or prescription misuse. Social History     Socioeconomic History    Marital status:      Spouse name: Not on file    Number of children: Not on file    Years of education: Not on file    Highest education level: Not on file   Occupational History    Not on file   Tobacco Use    Smoking status: Former     Types: Cigarettes     Quit date: 2011     Years since quittin.2    Smokeless tobacco: Never   Substance and Sexual Activity    Alcohol use: Yes     Comment: socially    Drug use: No    Sexual activity: Yes     Comment: Patient was prescribed the pill, but never started it. Other Topics Concern    Not on file   Social History Narrative    Not on file     Social Determinants of Health     Financial Resource Strain: Not on file   Food Insecurity: Not on file   Transportation Needs: Not on file   Physical Activity: Not on file   Stress: Not on file   Social Connections: Not on file   Intimate Partner Violence: Not on file   Housing Stability: Not on file       TOBACCO: Jennifer  reports that she quit smoking about 11 years ago. She has never used smokeless tobacco.  ETOH: Jennifer  reports current alcohol use.     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 Family History   Problem Relation Age of Onset    Kidney Disease Mother         kidney transplant    High Blood Pressure Mother     Anxiety Disorder Mother     Diabetes Mother     Diabetes Sister     Lupus Sister     Other Sister         kidney transplant recipient    Kidney Disease Sister     Anxiety Disorder Sister     Depression Sister     Fibromyalgia Sister     Diabetes Sister         type 1    Drug Abuse Father     Breast Cancer Maternal Grandmother     Alzheimer's Disease Maternal Grandmother     No Known Problems Maternal Grandfather     Dementia Paternal Grandmother     No Known Problems Paternal Grandfather     Anxiety Disorder Son     Other Son         seborrheic dermatitis       Last Labs: No results found for: LABA1C  No results found for: EAG   Lab Results   Component Value Date    WBC 6.0 05/22/2018    HGB 13.1 05/22/2018    HCT 36.4 05/22/2018    MCV 82.2 (A) 12/03/2020     05/22/2018    LABLYMP 37.5 07/30/2013    LYMPHOPCT 28.8 12/03/2020    RBC 4.63 12/03/2020    MCH 28.8 12/03/2020    MCHC 35.1 12/03/2020    RDW 14.3 12/03/2020       No results found for: NA, K, CL, CO2, BUN, CREATININE, GLUCOSE, CALCIUM, PROT, LABALBU, BILITOT, ALKPHOS, AST, ALT, LABGLOM, GFRAA, AGRATIO, GLOB  . last    Diagnosis:      1. Moderate episode of recurrent major depressive disorder ([facility] [address]    2. Panic attacks    3. CARINE (generalized anxiety disorder)    4. Medication management      Plan:    Discussed current medication regimen. Discussed risks and benefits of medications, as well as need for yearly lab work. Continue bupropion, buspirone, doxepin and clonazepam  Increase vilazodone from 10 mg to 20 mg daily to target anxiety and depression  Closure completed as [facility]) is closing the [facility].   List of community mental health providers given to patient. Continue work with PCP to manage medical concerns    Orders Placed This Encounter   Procedures    Discharge patient     Standing Status:   Standing     Number of Occurrences:   1     Order Specific Question:   Discharge Disposition     Answer:   Home     Orders Placed This Encounter   Medications    buPROPion (WELLBUTRIN XL) 300 MG extended release tablet     Sig: Take 1 tablet by mouth every morning     Dispense:  30 tablet     Refill:  5    doxepin (SINEQUAN) 10 MG capsule     Sig: Take 1 capsule by mouth nightly as needed (insomnia)     Dispense:  30 capsule     Refill:  5    vilazodone HCl (VIIBRYD) 20 MG TABS     Sig: Take 1 tablet by mouth daily     Dispense:  30 tablet     Refill:  5    clonazePAM (KLONOPIN) 0.5 MG tablet     Sig: Take 1 tablet by mouth 2 times daily for 90 days.      Dispense:  60 tablet     Refill:  2     Pt interventions:    Discussed importance of medication adherence, Discussed risks, benefits, side effects of medication and need for follow up treatment, and Supportive techniques

## 2023-01-25 ENCOUNTER — E-VISIT (OUTPATIENT)
Dept: OTHER | Facility: CLINIC | Age: 38
End: 2023-01-25
Payer: COMMERCIAL

## 2023-01-25 ENCOUNTER — TELEPHONE (OUTPATIENT)
Dept: PRIMARY CARE CLINIC | Age: 38
End: 2023-01-25

## 2023-01-25 DIAGNOSIS — R30.0 DYSURIA: Primary | ICD-10-CM

## 2023-01-25 PROCEDURE — 99422 OL DIG E/M SVC 11-20 MIN: CPT | Performed by: NURSE PRACTITIONER

## 2023-01-25 RX ORDER — CEPHALEXIN 500 MG/1
500 CAPSULE ORAL 2 TIMES DAILY
Qty: 14 CAPSULE | Refills: 0 | Status: SHIPPED | OUTPATIENT
Start: 2023-01-25 | End: 2023-02-01

## 2023-01-25 RX ORDER — CEPHALEXIN 500 MG/1
500 CAPSULE ORAL 2 TIMES DAILY
Qty: 14 CAPSULE | Refills: 0 | Status: SHIPPED | OUTPATIENT
Start: 2023-01-25 | End: 2023-01-25 | Stop reason: SDUPTHER

## 2023-02-21 ENCOUNTER — PATIENT MESSAGE (OUTPATIENT)
Dept: FAMILY MEDICINE CLINIC | Age: 38
End: 2023-02-21

## 2023-02-21 DIAGNOSIS — F41.1 GAD (GENERALIZED ANXIETY DISORDER): ICD-10-CM

## 2023-02-21 NOTE — TELEPHONE ENCOUNTER
From: Jarvis Cassidy  To: Dr. Tobi Calderon: 2/21/2023 3:06 PM EST  Subject: Prescription refills    Hi Dr. Mark Calles,    As you know [facility] in [facility] program shut down. I am now out of refills for Burproprion  mg (30 ct) and Clonzepam 0.5 mg (60 ct). Jakob Candle cannot refill them so I am hoping you can. My pharmacy is [facility] on [address]. I appreciate any assistance you can provide.     Thank you,  Charmaine Brunner

## 2023-02-22 RX ORDER — CLONAZEPAM 0.5 MG/1
0.5 TABLET ORAL 2 TIMES DAILY PRN
Qty: 60 TABLET | Refills: 0 | Status: SHIPPED | OUTPATIENT
Start: 2023-02-22 | End: 2023-05-23

## 2023-02-22 RX ORDER — BUPROPION HYDROCHLORIDE 300 MG/1
300 TABLET ORAL EVERY MORNING
Qty: 30 TABLET | Refills: 0 | Status: SHIPPED | OUTPATIENT
Start: 2023-02-22

## 2023-02-22 NOTE — TELEPHONE ENCOUNTER
Chart reviewed. Treatment is appropriate and appreciate psychiatry care. However long term benzo use has significant risks and I may not be able to keep her in primary care on that medication. She was also on doxepin and viibryd and has not requested those refills so I don't know if she is taking them. She has not been seen by me in about a year. I will fill for one month and have her come in to see me.

## 2023-03-24 DIAGNOSIS — F41.1 GAD (GENERALIZED ANXIETY DISORDER): ICD-10-CM

## 2023-03-24 NOTE — TELEPHONE ENCOUNTER
Pt scheduled for annual exam in April, pt has appointment with Ranjeet Peña next week but will need temp refill until then

## 2023-03-27 RX ORDER — BUPROPION HYDROCHLORIDE 300 MG/1
300 TABLET ORAL EVERY MORNING
Qty: 30 TABLET | Refills: 0 | Status: SHIPPED | OUTPATIENT
Start: 2023-03-27

## 2023-04-20 ENCOUNTER — OFFICE VISIT (OUTPATIENT)
Dept: FAMILY MEDICINE CLINIC | Age: 38
End: 2023-04-20
Payer: COMMERCIAL

## 2023-04-20 VITALS
BODY MASS INDEX: 32.74 KG/M2 | WEIGHT: 216 LBS | OXYGEN SATURATION: 98 % | DIASTOLIC BLOOD PRESSURE: 82 MMHG | SYSTOLIC BLOOD PRESSURE: 120 MMHG | HEART RATE: 94 BPM | HEIGHT: 68 IN

## 2023-04-20 DIAGNOSIS — M25.819 IMPINGEMENT OF SHOULDER: ICD-10-CM

## 2023-04-20 DIAGNOSIS — R53.83 OTHER FATIGUE: ICD-10-CM

## 2023-04-20 DIAGNOSIS — Z00.00 WELL ADULT EXAM: ICD-10-CM

## 2023-04-20 DIAGNOSIS — M25.531 PAIN IN BOTH WRISTS: ICD-10-CM

## 2023-04-20 DIAGNOSIS — Z11.9 SCREENING EXAMINATION FOR INFECTIOUS DISEASE: ICD-10-CM

## 2023-04-20 DIAGNOSIS — Z00.00 ENCOUNTER FOR WELL ADULT EXAM WITHOUT ABNORMAL FINDINGS: ICD-10-CM

## 2023-04-20 DIAGNOSIS — Z00.01 ENCOUNTER FOR WELL ADULT EXAM WITH ABNORMAL FINDINGS: Primary | ICD-10-CM

## 2023-04-20 DIAGNOSIS — M25.532 PAIN IN BOTH WRISTS: ICD-10-CM

## 2023-04-20 PROCEDURE — 99395 PREV VISIT EST AGE 18-39: CPT | Performed by: FAMILY MEDICINE

## 2023-04-20 ASSESSMENT — ENCOUNTER SYMPTOMS
ABDOMINAL PAIN: 0
BACK PAIN: 0
COUGH: 0
WHEEZING: 0
SHORTNESS OF BREATH: 0
CHEST TIGHTNESS: 0

## 2023-04-20 NOTE — PROGRESS NOTES
Chief Complaint   Patient presents with    Annual Exam     Patient is here today requesting an annual physical    Shoulder Pain     Pt reports pain in both shoulders every day, going on for more than a month, no single injury. She also has some wrist pain and family history of lupus and rheumatoid arthritis both. Depression     Patient with depression and anxiety worse since pregnancy loss a few years ago. She had been seeing Swetha Banks but is transferring care to psychiatry in [place] shortly. We had given her a bridging prescription. [place] NARRATIVE:    Going to meet with an old but new psychiatrist in [place] on [address]. Uses klonopin 1-2 times a day. Life stress is manageable. She continues on Wellbutrin. Both shoulders right worse (RHD). She is having trouble raising her arm over her head and is having pain with sleeping at night. No single injury. Symptoms for several weeks. She has family history of lupus and rheumatoid arthritis and other than the wrist and shoulder pain no symptoms but would like screened. Patient is established unless otherwise noted. Above chief complaint and [place] obtained by this physician provider. Review of Systems   Constitutional:  Negative for activity change, chills, fatigue and fever. HENT:  Negative for congestion. Respiratory:  Negative for cough, chest tightness, shortness of breath and wheezing. Cardiovascular:  Negative for chest pain and leg swelling. Gastrointestinal:  Negative for abdominal pain. Musculoskeletal:  Positive for arthralgias. Negative for back pain and myalgias. Skin:  Negative for rash. Psychiatric/Behavioral:  Positive for dysphoric mood. Negative for behavioral problems. The patient is nervous/anxious.       Allergies   Allergen Reactions    Buspirone Other (See Comments)     Ineffective    Effexor Xr [Venlafaxine Hcl Er] Other (See Comments)     Decreased libido and inability to attain orgasm    Lexapro

## 2023-04-21 SDOH — ECONOMIC STABILITY: FOOD INSECURITY: WITHIN THE PAST 12 MONTHS, THE FOOD YOU BOUGHT JUST DIDN'T LAST AND YOU DIDN'T HAVE MONEY TO GET MORE.: NEVER TRUE

## 2023-04-21 SDOH — ECONOMIC STABILITY: FOOD INSECURITY: WITHIN THE PAST 12 MONTHS, YOU WORRIED THAT YOUR FOOD WOULD RUN OUT BEFORE YOU GOT MONEY TO BUY MORE.: NEVER TRUE

## 2023-04-21 SDOH — ECONOMIC STABILITY: HOUSING INSECURITY
IN THE LAST 12 MONTHS, WAS THERE A TIME WHEN YOU DID NOT HAVE A STEADY PLACE TO SLEEP OR SLEPT IN A SHELTER (INCLUDING NOW)?: NO

## 2023-04-21 SDOH — ECONOMIC STABILITY: INCOME INSECURITY: HOW HARD IS IT FOR YOU TO PAY FOR THE VERY BASICS LIKE FOOD, HOUSING, MEDICAL CARE, AND HEATING?: NOT VERY HARD

## 2023-04-21 ASSESSMENT — PATIENT HEALTH QUESTIONNAIRE - PHQ9
6. FEELING BAD ABOUT YOURSELF - OR THAT YOU ARE A FAILURE OR HAVE LET YOURSELF OR YOUR FAMILY DOWN: 0
SUM OF ALL RESPONSES TO PHQ QUESTIONS 1-9: 8
SUM OF ALL RESPONSES TO PHQ9 QUESTIONS 1 & 2: 2
5. POOR APPETITE OR OVEREATING: 2
9. THOUGHTS THAT YOU WOULD BE BETTER OFF DEAD, OR OF HURTING YOURSELF: 0
3. TROUBLE FALLING OR STAYING ASLEEP: 1
SUM OF ALL RESPONSES TO PHQ QUESTIONS 1-9: 8
10. IF YOU CHECKED OFF ANY PROBLEMS, HOW DIFFICULT HAVE THESE PROBLEMS MADE IT FOR YOU TO DO YOUR WORK, TAKE CARE OF THINGS AT HOME, OR GET ALONG WITH OTHER PEOPLE: 1
2. FEELING DOWN, DEPRESSED OR HOPELESS: 1
SUM OF ALL RESPONSES TO PHQ QUESTIONS 1-9: 8
7. TROUBLE CONCENTRATING ON THINGS, SUCH AS READING THE NEWSPAPER OR WATCHING TELEVISION: 1
8. MOVING OR SPEAKING SO SLOWLY THAT OTHER PEOPLE COULD HAVE NOTICED. OR THE OPPOSITE, BEING SO FIGETY OR RESTLESS THAT YOU HAVE BEEN MOVING AROUND A LOT MORE THAN USUAL: 1
4. FEELING TIRED OR HAVING LITTLE ENERGY: 1
1. LITTLE INTEREST OR PLEASURE IN DOING THINGS: 1
SUM OF ALL RESPONSES TO PHQ QUESTIONS 1-9: 8

## 2024-07-05 ENCOUNTER — TELEMEDICINE ON DEMAND (OUTPATIENT)
Age: 39
End: 2024-07-05
Payer: COMMERCIAL

## 2024-07-05 DIAGNOSIS — K59.01 SLOW TRANSIT CONSTIPATION: Primary | ICD-10-CM

## 2024-07-05 PROCEDURE — 99213 OFFICE O/P EST LOW 20 MIN: CPT | Performed by: NURSE PRACTITIONER

## 2024-07-05 PROCEDURE — G8427 DOCREV CUR MEDS BY ELIG CLIN: HCPCS | Performed by: NURSE PRACTITIONER

## 2024-07-05 PROCEDURE — 1036F TOBACCO NON-USER: CPT | Performed by: NURSE PRACTITIONER

## 2024-07-05 PROCEDURE — G8421 BMI NOT CALCULATED: HCPCS | Performed by: NURSE PRACTITIONER

## 2024-07-05 RX ORDER — POLYETHYLENE GLYCOL 3350 17 G/17G
17 POWDER, FOR SOLUTION ORAL DAILY
Qty: 510 G | Refills: 0 | Status: SHIPPED | OUTPATIENT
Start: 2024-07-05 | End: 2024-08-04

## 2024-07-05 RX ORDER — POLYETHYLENE GLYCOL 3350 17 G/17G
17 POWDER, FOR SOLUTION ORAL DAILY
COMMUNITY
Start: 2024-04-17 | End: 2024-07-05

## 2024-07-05 ASSESSMENT — ENCOUNTER SYMPTOMS
NAUSEA: 0
RESPIRATORY NEGATIVE: 1
RECTAL PAIN: 0
ABDOMINAL PAIN: 0
CONSTIPATION: 1
BLOOD IN STOOL: 0
VOMITING: 0

## 2024-07-05 NOTE — PROGRESS NOTES
After good result may go to as needed for constipation. 2nd option for one time use would be Ducolax as directed.  Slowly increase fiber in diet to eliminate gas and bloating. (See educational handout)  Limit caffeine in diet.  Increase water in diet and stay hydrated.  Continue with walking regimen daily.  Please refer to educational handout.  Follow up with PCP in 3 days if not improving.  ED if worsening, no passage of stool, abdominal pain, fever, N/V, etc.    - polyethylene glycol (GLYCOLAX) 17 GM/SCOOP powder; Take 17 g by mouth daily 17 g (~1 heaping tablespoon) dissolved in 120 to 240 mL (4 to 8 ounces) of beverage, once daily; do not use for >1 to 2 weeks, after good result, can use as needed for constipation.  Dispense: 510 g; Refill: 0      The patient would benefit from future follow up with their usual PCP. As of the end of their Virtualist Visit today, follow up visit status is as follows: Patient to follow up as previously instructed by PCP      Total time spent on this encounter:  20 minutes    Return follow up with PCP in 3-4 days if symptoms worsen or fail to improve, for Constipation.    Jennifer Galvin, was evaluated through a synchronous (real-time) audio-video encounter. The patient (or guardian if applicable) is aware that this is a billable service, which includes applicable co-pays. This Virtual Visit was conducted with patient's (and/or legal guardian's) consent. Patient identification was verified, and a caregiver was present when appropriate.   The patient was located at Home: [address] [place] [address]  Provider was located at Other: [place]  Confirm you are appropriately licensed, registered, or certified to deliver care in the state where the patient is located as indicated above. If you are not or unsure, please re-schedule the visit: Yes, I confirm.        --TOD Trotter - CNP on 7/5/2024 at 8:59 AM    An electronic signature was used to authenticate this note.

## 2024-07-05 NOTE — PATIENT INSTRUCTIONS
Notify office if you have no improvement or worsening of condition.   Take medication as prescribed/directed. After good result may go to as needed for constipation. 2nd option for one time use would be Ducolax as directed.  Slowly increase fiber in diet to eliminate gas and bloating. (See educational handout)  Limit caffeine in diet.  Increase water in diet and stay hydrated.  Continue with walking regimen daily.  Please refer to educational handout.  Follow up with PCP in 3 days if not improving.  ED if worsening, no passage of stool, abdominal pain, fever, N/V, etc.

## 2024-07-26 ENCOUNTER — E-VISIT (OUTPATIENT)
Dept: PRIMARY CARE CLINIC | Age: 39
End: 2024-07-26

## 2024-07-26 DIAGNOSIS — K64.9 HEMORRHOIDS, UNSPECIFIED HEMORRHOID TYPE: Primary | ICD-10-CM

## 2024-07-26 RX ORDER — HYDROCORTISONE ACETATE 25 MG/1
25 SUPPOSITORY RECTAL EVERY 12 HOURS
Qty: 14 SUPPOSITORY | Refills: 0 | Status: SHIPPED | OUTPATIENT
Start: 2024-07-26

## 2024-07-26 NOTE — PROGRESS NOTES
Reviewed questionnaire    Reviewed meds/allergies    Dx Hemorrhoid    Plan Rx given for anusol, follow up with PCP if no improvement    Time spent on visit 11 min

## 2024-08-02 ENCOUNTER — TELEPHONE (OUTPATIENT)
Dept: FAMILY MEDICINE CLINIC | Age: 39
End: 2024-08-02

## 2024-08-02 NOTE — TELEPHONE ENCOUNTER
Abby Hernandez MD  P [facility]  In ER with constipation, not seen for over a year, please touch base and see if she is doing ok or maybe needs a f-u, 1-2 weeks is ok.    P

## 2024-11-26 ENCOUNTER — OFFICE VISIT (OUTPATIENT)
Dept: FAMILY MEDICINE CLINIC | Age: 39
End: 2024-11-26
Payer: COMMERCIAL

## 2024-11-26 VITALS
DIASTOLIC BLOOD PRESSURE: 84 MMHG | BODY MASS INDEX: 33.45 KG/M2 | WEIGHT: 220 LBS | SYSTOLIC BLOOD PRESSURE: 102 MMHG | OXYGEN SATURATION: 97 %

## 2024-11-26 DIAGNOSIS — K59.09 CHRONIC CONSTIPATION: Primary | ICD-10-CM

## 2024-11-26 DIAGNOSIS — F41.1 GAD (GENERALIZED ANXIETY DISORDER): ICD-10-CM

## 2024-11-26 PROCEDURE — 1036F TOBACCO NON-USER: CPT | Performed by: FAMILY MEDICINE

## 2024-11-26 PROCEDURE — G8484 FLU IMMUNIZE NO ADMIN: HCPCS | Performed by: FAMILY MEDICINE

## 2024-11-26 PROCEDURE — G8417 CALC BMI ABV UP PARAM F/U: HCPCS | Performed by: FAMILY MEDICINE

## 2024-11-26 PROCEDURE — G8427 DOCREV CUR MEDS BY ELIG CLIN: HCPCS | Performed by: FAMILY MEDICINE

## 2024-11-26 PROCEDURE — 99213 OFFICE O/P EST LOW 20 MIN: CPT | Performed by: FAMILY MEDICINE

## 2024-11-26 SDOH — ECONOMIC STABILITY: FOOD INSECURITY: WITHIN THE PAST 12 MONTHS, THE FOOD YOU BOUGHT JUST DIDN'T LAST AND YOU DIDN'T HAVE MONEY TO GET MORE.: NEVER TRUE

## 2024-11-26 SDOH — ECONOMIC STABILITY: FOOD INSECURITY: WITHIN THE PAST 12 MONTHS, YOU WORRIED THAT YOUR FOOD WOULD RUN OUT BEFORE YOU GOT MONEY TO BUY MORE.: NEVER TRUE

## 2024-11-26 SDOH — ECONOMIC STABILITY: INCOME INSECURITY: HOW HARD IS IT FOR YOU TO PAY FOR THE VERY BASICS LIKE FOOD, HOUSING, MEDICAL CARE, AND HEATING?: NOT HARD AT ALL

## 2024-11-26 ASSESSMENT — ENCOUNTER SYMPTOMS
CONSTIPATION: 1
WHEEZING: 0
ANAL BLEEDING: 1
ABDOMINAL PAIN: 0
BACK PAIN: 0
CHEST TIGHTNESS: 0
SHORTNESS OF BREATH: 0
COUGH: 0

## 2024-11-26 NOTE — PROGRESS NOTES
Previsit chart review: This patient has not been seen for more than a year.  She did have a miscarriage last year and ultimately underwent laparoscopic sterilization with tube removal, that in April.  She has been in the ER at [facility] twice for constipation on 7/31/2024 and 11/17/2024, both of those notes were reviewed by this provider today.  She was advised to follow-up with PCP multiple times including at a telemedicine visit in early July.  They also diagnosed her with hemorrhoids and provided her with hemorrhoid medication.  She states she has failed multiple interventions for constipation, however it is noted that she did not continue consistently with all of them.  
swelling.   Gastrointestinal:  Positive for anal bleeding and constipation. Negative for abdominal pain.   Musculoskeletal:  Negative for back pain and myalgias.   Skin:  Negative for rash.   Psychiatric/Behavioral:  Negative for behavioral problems and dysphoric mood.        Allergies   Allergen Reactions    Buspirone Other (See Comments)     Ineffective    Effexor Xr [Venlafaxine Hcl Er] Other (See Comments)     Decreased libido and inability to attain orgasm    Lexapro [Escitalopram Oxalate] Other (See Comments)     Multiple side effects    Trintellix [Vortioxetine] Other (See Comments)     Not effective     Allergy historyupdated.    Outpatient Medications Marked as Taking for the 24 encounter (Office Visit) with Abby Hernandez MD   Medication Sig Dispense Refill    hydrocortisone (ANUSOL-HC) 25 MG suppository Place 1 suppository rectally in the morning and 1 suppository in the evening. 14 suppository 0    buPROPion (WELLBUTRIN XL) 300 MG extended release tablet TAKE 1 TABLET BY MOUTH EVERY MORNING (Patient taking differently: Take 450 mg by mouth every morning) 30 tablet 0       Tobacco use history updated.   Social History     Tobacco Use   Smoking Status Former    Current packs/day: 0.00    Types: Cigarettes    Quit date: 2011    Years since quittin.3   Smokeless Tobacco Never        Nursing note reviewed.    Vitals:    24 1110   BP: 102/84   Site: Left Upper Arm   Position: Sitting   Cuff Size: Medium Adult   SpO2: 97%   Weight: 99.8 kg (220 lb)          BP Readings from Last 3 Encounters:   24 102/84   23 120/82   22 122/82     Wt Readings from Last 3 Encounters:   24 99.8 kg (220 lb)   23 98 kg (216 lb)   22 103 kg (227 lb)     Body mass index is 33.45 kg/m².    No results found for this visit on 24.    Physical Exam      Physical Exam  Vitals and nursing note reviewed.   Constitutional:       General: She is not in acute distress.

## 2024-11-27 ASSESSMENT — PATIENT HEALTH QUESTIONNAIRE - PHQ9
6. FEELING BAD ABOUT YOURSELF - OR THAT YOU ARE A FAILURE OR HAVE LET YOURSELF OR YOUR FAMILY DOWN: NOT AT ALL
1. LITTLE INTEREST OR PLEASURE IN DOING THINGS: NOT AT ALL
SUM OF ALL RESPONSES TO PHQ QUESTIONS 1-9: 5
9. THOUGHTS THAT YOU WOULD BE BETTER OFF DEAD, OR OF HURTING YOURSELF: NOT AT ALL
SUM OF ALL RESPONSES TO PHQ QUESTIONS 1-9: 5
5. POOR APPETITE OR OVEREATING: MORE THAN HALF THE DAYS
2. FEELING DOWN, DEPRESSED OR HOPELESS: NOT AT ALL
7. TROUBLE CONCENTRATING ON THINGS, SUCH AS READING THE NEWSPAPER OR WATCHING TELEVISION: MORE THAN HALF THE DAYS
8. MOVING OR SPEAKING SO SLOWLY THAT OTHER PEOPLE COULD HAVE NOTICED. OR THE OPPOSITE, BEING SO FIGETY OR RESTLESS THAT YOU HAVE BEEN MOVING AROUND A LOT MORE THAN USUAL: NOT AT ALL
10. IF YOU CHECKED OFF ANY PROBLEMS, HOW DIFFICULT HAVE THESE PROBLEMS MADE IT FOR YOU TO DO YOUR WORK, TAKE CARE OF THINGS AT HOME, OR GET ALONG WITH OTHER PEOPLE: SOMEWHAT DIFFICULT
SUM OF ALL RESPONSES TO PHQ9 QUESTIONS 1 & 2: 0
SUM OF ALL RESPONSES TO PHQ QUESTIONS 1-9: 5
3. TROUBLE FALLING OR STAYING ASLEEP: NOT AT ALL
SUM OF ALL RESPONSES TO PHQ QUESTIONS 1-9: 5
4. FEELING TIRED OR HAVING LITTLE ENERGY: SEVERAL DAYS

## 2025-06-23 ENCOUNTER — OFFICE VISIT (OUTPATIENT)
Dept: FAMILY MEDICINE CLINIC | Age: 40
End: 2025-06-23
Payer: COMMERCIAL

## 2025-06-23 VITALS
BODY MASS INDEX: 33.45 KG/M2 | SYSTOLIC BLOOD PRESSURE: 122 MMHG | HEIGHT: 68 IN | HEART RATE: 105 BPM | DIASTOLIC BLOOD PRESSURE: 82 MMHG | OXYGEN SATURATION: 98 %

## 2025-06-23 DIAGNOSIS — F33.1 MODERATE EPISODE OF RECURRENT MAJOR DEPRESSIVE DISORDER (HCC): ICD-10-CM

## 2025-06-23 DIAGNOSIS — K59.09 CHRONIC CONSTIPATION: ICD-10-CM

## 2025-06-23 DIAGNOSIS — R41.840 CONCENTRATION DEFICIT: ICD-10-CM

## 2025-06-23 DIAGNOSIS — E55.9 VITAMIN D DEFICIENCY: ICD-10-CM

## 2025-06-23 DIAGNOSIS — Z00.00 ADULT WELLNESS VISIT: Primary | ICD-10-CM

## 2025-06-23 DIAGNOSIS — E04.1 THYROID NODULE: ICD-10-CM

## 2025-06-23 PROCEDURE — G8427 DOCREV CUR MEDS BY ELIG CLIN: HCPCS | Performed by: FAMILY MEDICINE

## 2025-06-23 PROCEDURE — G8417 CALC BMI ABV UP PARAM F/U: HCPCS | Performed by: FAMILY MEDICINE

## 2025-06-23 PROCEDURE — 99214 OFFICE O/P EST MOD 30 MIN: CPT | Performed by: FAMILY MEDICINE

## 2025-06-23 PROCEDURE — 1036F TOBACCO NON-USER: CPT | Performed by: FAMILY MEDICINE

## 2025-06-23 RX ORDER — ATOMOXETINE 25 MG/1
25 CAPSULE ORAL DAILY
Qty: 30 CAPSULE | Refills: 1 | Status: SHIPPED | OUTPATIENT
Start: 2025-06-23 | End: 2025-08-22

## 2025-06-23 SDOH — ECONOMIC STABILITY: FOOD INSECURITY: WITHIN THE PAST 12 MONTHS, YOU WORRIED THAT YOUR FOOD WOULD RUN OUT BEFORE YOU GOT MONEY TO BUY MORE.: NEVER TRUE

## 2025-06-23 SDOH — ECONOMIC STABILITY: FOOD INSECURITY: WITHIN THE PAST 12 MONTHS, THE FOOD YOU BOUGHT JUST DIDN'T LAST AND YOU DIDN'T HAVE MONEY TO GET MORE.: NEVER TRUE

## 2025-06-23 NOTE — PROGRESS NOTES
Chief Complaint   Patient presents with    Annual Exam     Patient is requesting annual comprehensive evaluation and health maintenance intervention    GI Problem     Pt has had chronic constipation for about a year now . Pt was unable to get colonoscopy that was ordered due to out of pocket expense.     Weight Management     Pt would like to discuss medication options and other issues    Other     Pt reports trouble concentrating lately        [place] NARRATIVE:    History of Present Illness  The patient presents for an annual checkup with multiple complaints, including chronic constipation, weight management, and trouble concentrating.    She is still seeing bariatrics and is on Adipex regularly prescribed by them with some benefit only.    She has been experiencing chronic constipation for over a year, which was the primary concern during her last in-person visit in 11/2024. Despite multiple referrals to gastroenterology, she has not undergone a colonoscopy due to the out-of-pocket expense. She reports no presence of blood in her stool but experiences significant bloating. Her bowel movements are daily when she takes MiraLAX consistently but become irregular or absent without it. She reports no nausea, vomiting, or other gastrointestinal symptoms. She does not believe her current medications are contributing to her symptoms, which began around 04/2024 or 05/2024. She plans to switch to a regular insurance plan during the open enrollment period in 10/2025. She has found some relief with stool softeners and laxatives, particularly MiraLAX, which she uses consistently. However, she notes that her symptoms return when she discontinues its use. She continues to take Contrave and Wellbutrin, both of which contain bupropion, since 01/2025. She also takes clonazepam 1 mg once daily, although she often halves the dose. This medication is prescribed by a psychiatric nurse practitioner, whom she will see in a month.  This

## 2025-06-24 LAB
25(OH)D3 SERPL-MCNC: 19.3 NG/ML
ALBUMIN SERPL-MCNC: 4.7 G/DL (ref 3.4–5)
ALBUMIN/GLOB SERPL: 1.7 {RATIO} (ref 1.1–2.2)
ALP SERPL-CCNC: 44 U/L (ref 40–129)
ALT SERPL-CCNC: 19 U/L (ref 10–40)
ANION GAP SERPL CALCULATED.3IONS-SCNC: 9 MMOL/L (ref 3–16)
AST SERPL-CCNC: 17 U/L (ref 15–37)
BILIRUB SERPL-MCNC: 0.6 MG/DL (ref 0–1)
BUN SERPL-MCNC: 10 MG/DL (ref 7–20)
CALCIUM SERPL-MCNC: 9.9 MG/DL (ref 8.3–10.6)
CHLORIDE SERPL-SCNC: 106 MMOL/L (ref 99–110)
CHOLEST SERPL-MCNC: 164 MG/DL (ref 0–199)
CO2 SERPL-SCNC: 22 MMOL/L (ref 21–32)
CREAT SERPL-MCNC: 0.9 MG/DL (ref 0.6–1.1)
DEPRECATED RDW RBC AUTO: 14.4 % (ref 12.4–15.4)
GFR SERPLBLD CREATININE-BSD FMLA CKD-EPI: 84 ML/MIN/{1.73_M2}
GLUCOSE SERPL-MCNC: 92 MG/DL (ref 70–99)
HCT VFR BLD AUTO: 39.6 % (ref 36–48)
HDLC SERPL-MCNC: 46 MG/DL (ref 40–60)
HGB BLD-MCNC: 13.6 G/DL (ref 12–16)
LDLC SERPL CALC-MCNC: 96 MG/DL
MCH RBC QN AUTO: 27.9 PG (ref 26–34)
MCHC RBC AUTO-ENTMCNC: 34.4 G/DL (ref 31–36)
MCV RBC AUTO: 81.2 FL (ref 80–100)
PLATELET # BLD AUTO: 403 K/UL (ref 135–450)
PMV BLD AUTO: 8.2 FL (ref 5–10.5)
POTASSIUM SERPL-SCNC: 4.2 MMOL/L (ref 3.5–5.1)
PROT SERPL-MCNC: 7.4 G/DL (ref 6.4–8.2)
RBC # BLD AUTO: 4.88 M/UL (ref 4–5.2)
SODIUM SERPL-SCNC: 137 MMOL/L (ref 136–145)
TRIGL SERPL-MCNC: 109 MG/DL (ref 0–150)
TSH SERPL DL<=0.005 MIU/L-ACNC: 1.92 UIU/ML (ref 0.27–4.2)
VLDLC SERPL CALC-MCNC: 22 MG/DL
WBC # BLD AUTO: 6.4 K/UL (ref 4–11)

## 2025-06-25 ENCOUNTER — RESULTS FOLLOW-UP (OUTPATIENT)
Dept: FAMILY MEDICINE CLINIC | Age: 40
End: 2025-06-25

## 2025-06-25 DIAGNOSIS — E55.9 VITAMIN D DEFICIENCY: Primary | ICD-10-CM

## 2025-06-25 RX ORDER — ERGOCALCIFEROL 1.25 MG/1
50000 CAPSULE, LIQUID FILLED ORAL WEEKLY
Qty: 12 CAPSULE | Refills: 1 | Status: SHIPPED | OUTPATIENT
Start: 2025-06-25

## 2025-06-25 ASSESSMENT — ENCOUNTER SYMPTOMS
WHEEZING: 0
SHORTNESS OF BREATH: 0
BACK PAIN: 0
ABDOMINAL PAIN: 0
CHEST TIGHTNESS: 0
COUGH: 0

## 2025-06-25 ASSESSMENT — PATIENT HEALTH QUESTIONNAIRE - PHQ9
8. MOVING OR SPEAKING SO SLOWLY THAT OTHER PEOPLE COULD HAVE NOTICED. OR THE OPPOSITE, BEING SO FIGETY OR RESTLESS THAT YOU HAVE BEEN MOVING AROUND A LOT MORE THAN USUAL: SEVERAL DAYS
SUM OF ALL RESPONSES TO PHQ QUESTIONS 1-9: 6
7. TROUBLE CONCENTRATING ON THINGS, SUCH AS READING THE NEWSPAPER OR WATCHING TELEVISION: MORE THAN HALF THE DAYS
9. THOUGHTS THAT YOU WOULD BE BETTER OFF DEAD, OR OF HURTING YOURSELF: NOT AT ALL
1. LITTLE INTEREST OR PLEASURE IN DOING THINGS: NOT AT ALL
3. TROUBLE FALLING OR STAYING ASLEEP: SEVERAL DAYS
SUM OF ALL RESPONSES TO PHQ QUESTIONS 1-9: 6
4. FEELING TIRED OR HAVING LITTLE ENERGY: SEVERAL DAYS
5. POOR APPETITE OR OVEREATING: SEVERAL DAYS
10. IF YOU CHECKED OFF ANY PROBLEMS, HOW DIFFICULT HAVE THESE PROBLEMS MADE IT FOR YOU TO DO YOUR WORK, TAKE CARE OF THINGS AT HOME, OR GET ALONG WITH OTHER PEOPLE: SOMEWHAT DIFFICULT
2. FEELING DOWN, DEPRESSED OR HOPELESS: NOT AT ALL
6. FEELING BAD ABOUT YOURSELF - OR THAT YOU ARE A FAILURE OR HAVE LET YOURSELF OR YOUR FAMILY DOWN: NOT AT ALL

## 2025-06-25 NOTE — TELEPHONE ENCOUNTER
----- Message from MICHAEL CONRAD MA sent at 6/25/2025  9:23 AM EDT -----  Patient requested a script for the Vitamin D

## 2025-06-25 NOTE — RESULT ENCOUNTER NOTE
Lab results are reviewed and vitamin D is significantly low so she should take over-the-counter supplement 5000 IU a day or if she can let me know and I can order prescription strength supplementation.  Low vitamin D adds to fatigue and body pain her blood count was normal, lipid panel was good, TSH good with no evidence of low functioning thyroid and metabolic panel with normal electrolytes liver kidney function and sugar.

## 2025-07-18 ENCOUNTER — TELEPHONE (OUTPATIENT)
Dept: FAMILY MEDICINE CLINIC | Age: 40
End: 2025-07-18

## 2025-07-29 NOTE — PROGRESS NOTES
GENERAL SURGERY NOTE - Outpatient History & Physical  [facility]    PATIENT: Jennifer Galvin 1985, 39 y.o., female   Date: 7/30/2025    MRN: [id number]   Requesting Provider:  Abby Hernandez MD History Obtained From:  patient, electronic medical record     Reason for Evaluation & Chief Complaint:    Chief Complaint   Patient presents with    New Patient     N/P Constipation and lower General ABD Pain       HISTORY OF PRESENT ILLNESS:    Jennifer Galvin is a 39 y.o. female presenting with concern for constipation and an abnormal appendix on CT. She has had multiple CT scans where the appendix has not had inflammatory changes but has been noted to be fluid filled.     In November she had a CT scan due to abdominal pain and constipation, had issues with hemorrhoids when the constipation was bad.   She was planned to get a colonoscopy but ended up not doing it due to cost.     CT AP 7/13/25 IMPRESSION:IMPRESSION:   Stool in the colon.  No bowel obstruction or inflammatory changes.   Redemonstration of a dilated, bulbous appearance of the mid to distal   appendix, with an underlying neoplasm such as mucinous neoplasm of the appendix or appendiceal mucocele difficult to exclude.     Dr. Hernandez's note:   She has been experiencing chronic constipation for over a year, which was the primary concern during her last in-person visit in 11/2024. Despite multiple referrals to gastroenterology, she has not undergone a colonoscopy due to the out-of-pocket expense. She reports no presence of blood in her stool but experiences significant bloating. Her bowel movements are daily when she takes MiraLAX consistently but become irregular or absent without it. She reports no nausea, vomiting, or other gastrointestinal symptoms. She does not believe her current medications are contributing to her symptoms, which began around 04/2024 or 05/2024. She plans to switch to a regular insurance plan during the open enrollment

## 2025-07-30 ENCOUNTER — OFFICE VISIT (OUTPATIENT)
Dept: SURGERY | Age: 40
End: 2025-07-30
Payer: COMMERCIAL

## 2025-07-30 VITALS
DIASTOLIC BLOOD PRESSURE: 86 MMHG | OXYGEN SATURATION: 98 % | HEIGHT: 68 IN | HEART RATE: 131 BPM | WEIGHT: 215.8 LBS | SYSTOLIC BLOOD PRESSURE: 118 MMHG | BODY MASS INDEX: 32.71 KG/M2

## 2025-07-30 DIAGNOSIS — K38.9 DISEASE OF APPENDIX, UNSPECIFIED: Primary | ICD-10-CM

## 2025-07-30 DIAGNOSIS — K59.04 CHRONIC IDIOPATHIC CONSTIPATION: ICD-10-CM

## 2025-07-30 PROCEDURE — G8417 CALC BMI ABV UP PARAM F/U: HCPCS | Performed by: SURGERY

## 2025-07-30 PROCEDURE — 99204 OFFICE O/P NEW MOD 45 MIN: CPT | Performed by: SURGERY

## 2025-07-30 PROCEDURE — 1036F TOBACCO NON-USER: CPT | Performed by: SURGERY

## 2025-07-30 PROCEDURE — G8427 DOCREV CUR MEDS BY ELIG CLIN: HCPCS | Performed by: SURGERY

## 2025-07-30 RX ORDER — ATOMOXETINE 40 MG/1
40 CAPSULE ORAL DAILY
COMMUNITY
Start: 2025-07-18

## 2025-07-31 PROBLEM — K59.04 CHRONIC IDIOPATHIC CONSTIPATION: Status: ACTIVE | Noted: 2025-07-31

## 2025-07-31 PROBLEM — K38.9 DISEASE OF APPENDIX, UNSPECIFIED: Status: ACTIVE | Noted: 2025-07-31

## 2025-07-31 RX ORDER — SODIUM CHLORIDE 0.9 % (FLUSH) 0.9 %
5-40 SYRINGE (ML) INJECTION EVERY 12 HOURS SCHEDULED
OUTPATIENT
Start: 2025-07-31

## 2025-07-31 RX ORDER — SODIUM CHLORIDE 0.9 % (FLUSH) 0.9 %
5-40 SYRINGE (ML) INJECTION PRN
OUTPATIENT
Start: 2025-07-31

## 2025-07-31 RX ORDER — SODIUM CHLORIDE, SODIUM LACTATE, POTASSIUM CHLORIDE, CALCIUM CHLORIDE 600; 310; 30; 20 MG/100ML; MG/100ML; MG/100ML; MG/100ML
INJECTION, SOLUTION INTRAVENOUS CONTINUOUS
OUTPATIENT
Start: 2025-07-31

## 2025-07-31 RX ORDER — SODIUM CHLORIDE 9 MG/ML
INJECTION, SOLUTION INTRAVENOUS PRN
OUTPATIENT
Start: 2025-07-31

## 2025-07-31 ASSESSMENT — ENCOUNTER SYMPTOMS
SORE THROAT: 0
SHORTNESS OF BREATH: 0
CHEST TIGHTNESS: 0
CONSTIPATION: 1
ABDOMINAL DISTENTION: 1
ABDOMINAL PAIN: 0
VOMITING: 0
EYE REDNESS: 0
DIARRHEA: 0
COLOR CHANGE: 0
BLOOD IN STOOL: 0
EYE DISCHARGE: 0
NAUSEA: 0

## 2025-08-04 ENCOUNTER — TELEPHONE (OUTPATIENT)
Dept: SURGERY | Age: 40
End: 2025-08-04

## 2025-08-04 PROBLEM — K38.9 DISEASE OF APPENDIX: Status: ACTIVE | Noted: 2025-08-04

## 2025-08-08 RX ORDER — COLLAGEN, HYDROLYSATE (BOVINE) 100 %
POWDER (GRAM) MISCELLANEOUS DAILY
COMMUNITY

## 2025-08-20 ENCOUNTER — ANESTHESIA EVENT (OUTPATIENT)
Dept: OPERATING ROOM | Age: 40
End: 2025-08-20
Payer: COMMERCIAL

## 2025-08-22 ENCOUNTER — HOSPITAL ENCOUNTER (OUTPATIENT)
Age: 40
Setting detail: OUTPATIENT SURGERY
Discharge: HOME | End: 2025-08-22
Attending: SURGERY | Admitting: SURGERY
Payer: COMMERCIAL

## 2025-08-22 ENCOUNTER — ANESTHESIA (OUTPATIENT)
Dept: OPERATING ROOM | Age: 40
End: 2025-08-22
Payer: COMMERCIAL

## 2025-08-22 VITALS
OXYGEN SATURATION: 100 % | HEIGHT: 68 IN | HEART RATE: 81 BPM | WEIGHT: 208 LBS | BODY MASS INDEX: 31.52 KG/M2 | DIASTOLIC BLOOD PRESSURE: 60 MMHG | TEMPERATURE: 97.3 F | RESPIRATION RATE: 16 BRPM | SYSTOLIC BLOOD PRESSURE: 111 MMHG

## 2025-08-22 DIAGNOSIS — K38.9 DISEASE OF APPENDIX: ICD-10-CM

## 2025-08-22 LAB
BASOPHILS # BLD: 0.09 K/UL
BASOPHILS NFR BLD: 2 % (ref 0–1)
EOSINOPHIL # BLD: 0.14 K/UL
EOSINOPHILS RELATIVE PERCENT: 2 % (ref 0–3)
ERYTHROCYTE [DISTWIDTH] IN BLOOD BY AUTOMATED COUNT: 13.4 % (ref 11.7–14.9)
HCG, PREGNANCY URINE (POC): NEGATIVE
HCT VFR BLD AUTO: 35.4 % (ref 37–47)
HGB BLD-MCNC: 12.3 G/DL (ref 12.5–16)
IMM GRANULOCYTES # BLD AUTO: 0.01 K/UL
IMM GRANULOCYTES NFR BLD: 0 %
LYMPHOCYTES NFR BLD: 2.63 K/UL
LYMPHOCYTES RELATIVE PERCENT: 46 % (ref 24–44)
MCH RBC QN AUTO: 27.3 PG (ref 27–31)
MCHC RBC AUTO-ENTMCNC: 34.7 G/DL (ref 32–36)
MCV RBC AUTO: 78.7 FL (ref 78–100)
MONOCYTES NFR BLD: 0.46 K/UL
MONOCYTES NFR BLD: 8 % (ref 0–5)
NEUTROPHILS NFR BLD: 42 % (ref 36–66)
NEUTS SEG NFR BLD: 2.43 K/UL
PLATELET # BLD AUTO: 387 K/UL (ref 140–440)
PMV BLD AUTO: 9.2 FL (ref 7.5–11.1)
RBC # BLD AUTO: 4.5 M/UL (ref 4.2–5.4)
WBC OTHER # BLD: 5.8 K/UL (ref 4–10.5)

## 2025-08-22 PROCEDURE — 6360000002 HC RX W HCPCS: Performed by: SURGERY

## 2025-08-22 PROCEDURE — 44970 LAPAROSCOPY APPENDECTOMY: CPT | Performed by: SURGERY

## 2025-08-22 PROCEDURE — 2580000003 HC RX 258: Performed by: SURGERY

## 2025-08-22 PROCEDURE — 88304 TISSUE EXAM BY PATHOLOGIST: CPT

## 2025-08-22 PROCEDURE — 2709999900 HC NON-CHARGEABLE SUPPLY: Performed by: SURGERY

## 2025-08-22 PROCEDURE — 3600000019 HC SURGERY ROBOT ADDTL 15MIN: Performed by: SURGERY

## 2025-08-22 PROCEDURE — 3700000000 HC ANESTHESIA ATTENDED CARE: Performed by: SURGERY

## 2025-08-22 PROCEDURE — 2500000003 HC RX 250 WO HCPCS: Performed by: SURGERY

## 2025-08-22 PROCEDURE — 2500000003 HC RX 250 WO HCPCS: Performed by: STUDENT IN AN ORGANIZED HEALTH CARE EDUCATION/TRAINING PROGRAM

## 2025-08-22 PROCEDURE — 7100000011 HC PHASE II RECOVERY - ADDTL 15 MIN: Performed by: SURGERY

## 2025-08-22 PROCEDURE — 81025 URINE PREGNANCY TEST: CPT

## 2025-08-22 PROCEDURE — 6370000000 HC RX 637 (ALT 250 FOR IP): Performed by: STUDENT IN AN ORGANIZED HEALTH CARE EDUCATION/TRAINING PROGRAM

## 2025-08-22 PROCEDURE — 36415 COLL VENOUS BLD VENIPUNCTURE: CPT

## 2025-08-22 PROCEDURE — S2900 ROBOTIC SURGICAL SYSTEM: HCPCS | Performed by: SURGERY

## 2025-08-22 PROCEDURE — 85025 COMPLETE CBC W/AUTO DIFF WBC: CPT

## 2025-08-22 PROCEDURE — 7100000010 HC PHASE II RECOVERY - FIRST 15 MIN: Performed by: SURGERY

## 2025-08-22 PROCEDURE — 2720000010 HC SURG SUPPLY STERILE: Performed by: SURGERY

## 2025-08-22 PROCEDURE — 7100000001 HC PACU RECOVERY - ADDTL 15 MIN: Performed by: SURGERY

## 2025-08-22 PROCEDURE — 7100000000 HC PACU RECOVERY - FIRST 15 MIN: Performed by: SURGERY

## 2025-08-22 PROCEDURE — 6360000002 HC RX W HCPCS: Performed by: STUDENT IN AN ORGANIZED HEALTH CARE EDUCATION/TRAINING PROGRAM

## 2025-08-22 PROCEDURE — 3700000001 HC ADD 15 MINUTES (ANESTHESIA): Performed by: SURGERY

## 2025-08-22 PROCEDURE — 3600000009 HC SURGERY ROBOT BASE: Performed by: SURGERY

## 2025-08-22 RX ORDER — POLYETHYLENE GLYCOL 3350 17 G/17G
17 POWDER, FOR SOLUTION ORAL DAILY PRN
Qty: 510 G | Refills: 0 | Status: SHIPPED | OUTPATIENT
Start: 2025-08-22 | End: 2025-09-21

## 2025-08-22 RX ORDER — MIDAZOLAM HYDROCHLORIDE 1 MG/ML
INJECTION, SOLUTION INTRAMUSCULAR; INTRAVENOUS
Status: DISCONTINUED | OUTPATIENT
Start: 2025-08-22 | End: 2025-08-22 | Stop reason: SDUPTHER

## 2025-08-22 RX ORDER — SODIUM CHLORIDE 0.9 % (FLUSH) 0.9 %
5-40 SYRINGE (ML) INJECTION PRN
Status: DISCONTINUED | OUTPATIENT
Start: 2025-08-22 | End: 2025-08-22 | Stop reason: HOSPADM

## 2025-08-22 RX ORDER — SODIUM CHLORIDE 0.9 % (FLUSH) 0.9 %
5-40 SYRINGE (ML) INJECTION EVERY 12 HOURS SCHEDULED
Status: DISCONTINUED | OUTPATIENT
Start: 2025-08-22 | End: 2025-08-22 | Stop reason: HOSPADM

## 2025-08-22 RX ORDER — HYDROCODONE BITARTRATE AND ACETAMINOPHEN 5; 325 MG/1; MG/1
1 TABLET ORAL EVERY 6 HOURS PRN
Qty: 12 TABLET | Refills: 0 | Status: SHIPPED | OUTPATIENT
Start: 2025-08-22 | End: 2025-08-25

## 2025-08-22 RX ORDER — ESMOLOL HYDROCHLORIDE 10 MG/ML
INJECTION INTRAVENOUS
Status: DISCONTINUED | OUTPATIENT
Start: 2025-08-22 | End: 2025-08-22 | Stop reason: SDUPTHER

## 2025-08-22 RX ORDER — ONDANSETRON 2 MG/ML
INJECTION INTRAMUSCULAR; INTRAVENOUS
Status: DISCONTINUED | OUTPATIENT
Start: 2025-08-22 | End: 2025-08-22 | Stop reason: SDUPTHER

## 2025-08-22 RX ORDER — DEXAMETHASONE SODIUM PHOSPHATE 4 MG/ML
INJECTION, SOLUTION INTRA-ARTICULAR; INTRALESIONAL; INTRAMUSCULAR; INTRAVENOUS; SOFT TISSUE
Status: DISCONTINUED | OUTPATIENT
Start: 2025-08-22 | End: 2025-08-22 | Stop reason: SDUPTHER

## 2025-08-22 RX ORDER — DOCUSATE SODIUM 100 MG/1
100 CAPSULE, LIQUID FILLED ORAL 2 TIMES DAILY
Qty: 28 CAPSULE | Refills: 0 | Status: SHIPPED | OUTPATIENT
Start: 2025-08-22 | End: 2025-09-05

## 2025-08-22 RX ORDER — SODIUM CHLORIDE, SODIUM LACTATE, POTASSIUM CHLORIDE, CALCIUM CHLORIDE 600; 310; 30; 20 MG/100ML; MG/100ML; MG/100ML; MG/100ML
INJECTION, SOLUTION INTRAVENOUS CONTINUOUS
Status: DISCONTINUED | OUTPATIENT
Start: 2025-08-22 | End: 2025-08-22 | Stop reason: HOSPADM

## 2025-08-22 RX ORDER — ONDANSETRON 2 MG/ML
4 INJECTION INTRAMUSCULAR; INTRAVENOUS
Status: COMPLETED | OUTPATIENT
Start: 2025-08-22 | End: 2025-08-22

## 2025-08-22 RX ORDER — LIDOCAINE HYDROCHLORIDE 20 MG/ML
INJECTION, SOLUTION INTRAVENOUS
Status: DISCONTINUED | OUTPATIENT
Start: 2025-08-22 | End: 2025-08-22 | Stop reason: SDUPTHER

## 2025-08-22 RX ORDER — ROCURONIUM BROMIDE 10 MG/ML
INJECTION, SOLUTION INTRAVENOUS
Status: DISCONTINUED | OUTPATIENT
Start: 2025-08-22 | End: 2025-08-22 | Stop reason: SDUPTHER

## 2025-08-22 RX ORDER — LABETALOL HYDROCHLORIDE 5 MG/ML
10 INJECTION, SOLUTION INTRAVENOUS
Status: DISCONTINUED | OUTPATIENT
Start: 2025-08-22 | End: 2025-08-22 | Stop reason: HOSPADM

## 2025-08-22 RX ORDER — METRONIDAZOLE 500 MG/100ML
500 INJECTION, SOLUTION INTRAVENOUS
Status: COMPLETED | OUTPATIENT
Start: 2025-08-22 | End: 2025-08-22

## 2025-08-22 RX ORDER — SODIUM CHLORIDE 9 MG/ML
INJECTION, SOLUTION INTRAVENOUS PRN
Status: DISCONTINUED | OUTPATIENT
Start: 2025-08-22 | End: 2025-08-22 | Stop reason: HOSPADM

## 2025-08-22 RX ORDER — OXYCODONE HYDROCHLORIDE 5 MG/1
5 TABLET ORAL PRN
Status: COMPLETED | OUTPATIENT
Start: 2025-08-22 | End: 2025-08-22

## 2025-08-22 RX ORDER — MIDAZOLAM HYDROCHLORIDE 1 MG/ML
INJECTION, SOLUTION INTRAMUSCULAR; INTRAVENOUS
Status: COMPLETED
Start: 2025-08-22 | End: 2025-08-22

## 2025-08-22 RX ORDER — BUPIVACAINE HYDROCHLORIDE 5 MG/ML
INJECTION, SOLUTION EPIDURAL; INTRACAUDAL; PERINEURAL
Status: DISCONTINUED | OUTPATIENT
Start: 2025-08-22 | End: 2025-08-22 | Stop reason: ALTCHOICE

## 2025-08-22 RX ORDER — FENTANYL CITRATE 50 UG/ML
INJECTION, SOLUTION INTRAMUSCULAR; INTRAVENOUS
Status: DISCONTINUED | OUTPATIENT
Start: 2025-08-22 | End: 2025-08-22 | Stop reason: SDUPTHER

## 2025-08-22 RX ORDER — KETOROLAC TROMETHAMINE 30 MG/ML
15 INJECTION, SOLUTION INTRAMUSCULAR; INTRAVENOUS ONCE
Status: COMPLETED | OUTPATIENT
Start: 2025-08-22 | End: 2025-08-22

## 2025-08-22 RX ORDER — FENTANYL CITRATE 50 UG/ML
25 INJECTION, SOLUTION INTRAMUSCULAR; INTRAVENOUS EVERY 5 MIN PRN
Status: COMPLETED | OUTPATIENT
Start: 2025-08-22 | End: 2025-08-22

## 2025-08-22 RX ORDER — METOCLOPRAMIDE HYDROCHLORIDE 5 MG/ML
10 INJECTION INTRAMUSCULAR; INTRAVENOUS
Status: DISCONTINUED | OUTPATIENT
Start: 2025-08-22 | End: 2025-08-22 | Stop reason: HOSPADM

## 2025-08-22 RX ORDER — PROPOFOL 10 MG/ML
INJECTION, EMULSION INTRAVENOUS
Status: DISCONTINUED | OUTPATIENT
Start: 2025-08-22 | End: 2025-08-22 | Stop reason: SDUPTHER

## 2025-08-22 RX ORDER — OXYCODONE HYDROCHLORIDE 5 MG/1
10 TABLET ORAL PRN
Status: COMPLETED | OUTPATIENT
Start: 2025-08-22 | End: 2025-08-22

## 2025-08-22 RX ADMIN — SODIUM CHLORIDE, SODIUM LACTATE, POTASSIUM CHLORIDE, AND CALCIUM CHLORIDE: .6; .31; .03; .02 INJECTION, SOLUTION INTRAVENOUS at 07:55

## 2025-08-22 RX ADMIN — LIDOCAINE HYDROCHLORIDE 100 MG: 20 INJECTION, SOLUTION INTRAVENOUS at 09:33

## 2025-08-22 RX ADMIN — OXYCODONE HYDROCHLORIDE 5 MG: 5 TABLET ORAL at 12:16

## 2025-08-22 RX ADMIN — FENTANYL CITRATE 25 MCG: 50 INJECTION INTRAMUSCULAR; INTRAVENOUS at 11:35

## 2025-08-22 RX ADMIN — DEXAMETHASONE SODIUM PHOSPHATE 4 MG: 4 INJECTION, SOLUTION INTRA-ARTICULAR; INTRALESIONAL; INTRAMUSCULAR; INTRAVENOUS; SOFT TISSUE at 09:47

## 2025-08-22 RX ADMIN — FENTANYL CITRATE 25 MCG: 50 INJECTION INTRAMUSCULAR; INTRAVENOUS at 11:16

## 2025-08-22 RX ADMIN — ESMOLOL HYDROCHLORIDE 50 MG: 10 INJECTION INTRAVENOUS at 09:53

## 2025-08-22 RX ADMIN — MIDAZOLAM HYDROCHLORIDE 4 MG: 1 INJECTION, SOLUTION INTRAMUSCULAR; INTRAVENOUS at 09:25

## 2025-08-22 RX ADMIN — ROCURONIUM BROMIDE 50 MG: 10 INJECTION, SOLUTION INTRAVENOUS at 09:33

## 2025-08-22 RX ADMIN — ONDANSETRON 4 MG: 2 INJECTION INTRAMUSCULAR; INTRAVENOUS at 10:27

## 2025-08-22 RX ADMIN — CEFAZOLIN 2000 MG: 2 INJECTION, POWDER, FOR SOLUTION INTRAMUSCULAR; INTRAVENOUS at 09:47

## 2025-08-22 RX ADMIN — ONDANSETRON 4 MG: 2 INJECTION INTRAMUSCULAR; INTRAVENOUS at 11:16

## 2025-08-22 RX ADMIN — KETOROLAC TROMETHAMINE 15 MG: 30 INJECTION, SOLUTION INTRAMUSCULAR; INTRAVENOUS at 11:25

## 2025-08-22 RX ADMIN — Medication 1 MG: at 10:27

## 2025-08-22 RX ADMIN — PROPOFOL 200 MG: 10 INJECTION, EMULSION INTRAVENOUS at 09:33

## 2025-08-22 RX ADMIN — SODIUM CHLORIDE, PRESERVATIVE FREE 10 ML: 5 INJECTION INTRAVENOUS at 08:05

## 2025-08-22 RX ADMIN — ESMOLOL HYDROCHLORIDE 50 MG: 10 INJECTION INTRAVENOUS at 09:33

## 2025-08-22 RX ADMIN — METRONIDAZOLE 500 MG: 500 INJECTION, SOLUTION INTRAVENOUS at 09:47

## 2025-08-22 RX ADMIN — FENTANYL CITRATE 100 MCG: 50 INJECTION, SOLUTION INTRAMUSCULAR; INTRAVENOUS at 09:33

## 2025-08-22 ASSESSMENT — PAIN SCALES - GENERAL
PAINLEVEL_OUTOF10: 6
PAINLEVEL_OUTOF10: 0
PAINLEVEL_OUTOF10: 8
PAINLEVEL_OUTOF10: 5
PAINLEVEL_OUTOF10: 7
PAINLEVEL_OUTOF10: 7
PAINLEVEL_OUTOF10: 5
PAINLEVEL_OUTOF10: 6

## 2025-08-22 ASSESSMENT — PAIN - FUNCTIONAL ASSESSMENT
PAIN_FUNCTIONAL_ASSESSMENT: ACTIVITIES ARE NOT PREVENTED
PAIN_FUNCTIONAL_ASSESSMENT: ACTIVITIES ARE NOT PREVENTED
PAIN_FUNCTIONAL_ASSESSMENT: PREVENTS OR INTERFERES SOME ACTIVE ACTIVITIES AND ADLS
PAIN_FUNCTIONAL_ASSESSMENT: PREVENTS OR INTERFERES SOME ACTIVE ACTIVITIES AND ADLS
PAIN_FUNCTIONAL_ASSESSMENT: 0-10
PAIN_FUNCTIONAL_ASSESSMENT: 0-10
PAIN_FUNCTIONAL_ASSESSMENT: ACTIVITIES ARE NOT PREVENTED
PAIN_FUNCTIONAL_ASSESSMENT: 0-10
PAIN_FUNCTIONAL_ASSESSMENT: PREVENTS OR INTERFERES SOME ACTIVE ACTIVITIES AND ADLS
PAIN_FUNCTIONAL_ASSESSMENT: 0-10
PAIN_FUNCTIONAL_ASSESSMENT: PREVENTS OR INTERFERES SOME ACTIVE ACTIVITIES AND ADLS
PAIN_FUNCTIONAL_ASSESSMENT: 0-10

## 2025-08-22 ASSESSMENT — PAIN DESCRIPTION - ORIENTATION
ORIENTATION: RIGHT;MID;LEFT
ORIENTATION: RIGHT;MID;LEFT
ORIENTATION: RIGHT;LEFT;MID
ORIENTATION: RIGHT;MID;LEFT
ORIENTATION: RIGHT;MID;LEFT
ORIENTATION: RIGHT

## 2025-08-22 ASSESSMENT — PAIN DESCRIPTION - FREQUENCY
FREQUENCY: CONTINUOUS

## 2025-08-22 ASSESSMENT — PAIN DESCRIPTION - DESCRIPTORS
DESCRIPTORS: ACHING

## 2025-08-22 ASSESSMENT — PAIN DESCRIPTION - PAIN TYPE
TYPE: SURGICAL PAIN

## 2025-08-22 ASSESSMENT — PAIN SCALES - WONG BAKER
WONGBAKER_NUMERICALRESPONSE: NO HURT
WONGBAKER_NUMERICALRESPONSE: NO HURT

## 2025-08-22 ASSESSMENT — PAIN DESCRIPTION - ONSET
ONSET: ON-GOING

## 2025-08-22 ASSESSMENT — PAIN DESCRIPTION - LOCATION
LOCATION: ABDOMEN

## 2025-08-25 ENCOUNTER — TELEPHONE (OUTPATIENT)
Dept: SURGERY | Age: 40
End: 2025-08-25

## 2025-08-26 LAB — SURGICAL PATHOLOGY REPORT: NORMAL

## (undated) DEVICE — GOWN SURG L L43IN BLU REINF VELC AND TIE LEV 4 IMPERV CRIT

## (undated) DEVICE — FORCEPS SURG DIA8MM ROBOTIC CADIERE ENDOWRIST

## (undated) DEVICE — SCISSORS SURG DIA8MM MPLR CRV ENDOWRIST

## (undated) DEVICE — GOWN SURGICAL XL-XLONG L52IN BLUE POLY REINFORCED BREATHABLE FILM SLEEVE

## (undated) DEVICE — Device

## (undated) DEVICE — TROCAR LAP L100MM DIA5MM ABD Z-THREAD 1ST ENTRY KII FIOS

## (undated) DEVICE — BAG SPEC REM 224ML W4XL6IN DIA10MM 1 HND GYN DISP ENDOPCH

## (undated) DEVICE — COVER TIP DIA8MM DA VINCI SI XI X ENDOWRIST

## (undated) DEVICE — FOLEY TRAY 1 LAYR 16 FR 10 CC URIMTR 100% SIL SNAPSECURE LF

## (undated) DEVICE — Z DISCONTINUED USE 2220241 SUTURE SZ 0 27IN 5/8 CIR UR-6  TAPER PT VIOLET ABSRB VICRYL J603H

## (undated) DEVICE — NEEDLE 20GAX1.5 HYPO SHRT BVL

## (undated) DEVICE — Z DISCONTINUED USE 2220193 SUTURE VCRL SZ 4-0 L27IN ABSRB UD L19MM FS-2 3/8 CIR REV J422H

## (undated) DEVICE — Z DISCONTINUED USE 2764362 SEAL ENDOSCP INSTR DIA5-8MM UNIV FOR CANN DA VINCI XI

## (undated) DEVICE — ADHESIVE SKIN CLOSURE TOP 0.8 CC PREM PUR LIQUIBAND RAPID LF

## (undated) DEVICE — GLOVE SURG SZ 6 THK91MIL LTX FREE SYN POLYISOPRENE ANTI

## (undated) DEVICE — SYRINGE MED LEURLOK 20 CC

## (undated) DEVICE — FORCEPS SURG L33.58CM DIA8MM JAW L2.8CM 42DEG BPLR OPN ANG

## (undated) DEVICE — APPLIER CLP M/L SHFT DIA5MM 15 LIG LIGAMAX 5

## (undated) DEVICE — GLOVE ORANGE PI 7 1/2   MSG9075